# Patient Record
Sex: FEMALE | Race: WHITE | NOT HISPANIC OR LATINO | Employment: OTHER | URBAN - METROPOLITAN AREA
[De-identification: names, ages, dates, MRNs, and addresses within clinical notes are randomized per-mention and may not be internally consistent; named-entity substitution may affect disease eponyms.]

---

## 2017-02-12 ENCOUNTER — APPOINTMENT (OUTPATIENT)
Dept: GENERAL RADIOLOGY | Facility: CLINIC | Age: 62
End: 2017-02-12
Attending: EMERGENCY MEDICINE
Payer: MEDICARE

## 2017-02-12 ENCOUNTER — HOSPITAL ENCOUNTER (EMERGENCY)
Facility: CLINIC | Age: 62
Discharge: HOME | End: 2017-02-12
Attending: EMERGENCY MEDICINE | Admitting: EMERGENCY MEDICINE
Payer: MEDICARE

## 2017-02-12 VITALS
RESPIRATION RATE: 16 BRPM | SYSTOLIC BLOOD PRESSURE: 130 MMHG | WEIGHT: 160 LBS | BODY MASS INDEX: 30.21 KG/M2 | OXYGEN SATURATION: 99 % | DIASTOLIC BLOOD PRESSURE: 89 MMHG | TEMPERATURE: 98.5 F | HEIGHT: 61 IN | HEART RATE: 80 BPM

## 2017-02-12 DIAGNOSIS — M25.551 HIP PAIN, RIGHT: ICD-10-CM

## 2017-02-12 DIAGNOSIS — M70.61 TROCHANTERIC BURSITIS OF BOTH HIPS: ICD-10-CM

## 2017-02-12 DIAGNOSIS — M70.62 TROCHANTERIC BURSITIS OF BOTH HIPS: ICD-10-CM

## 2017-02-12 PROCEDURE — 72170 X-RAY EXAM OF PELVIS: CPT

## 2017-02-12 PROCEDURE — 99283 EMERGENCY DEPT VISIT LOW MDM: CPT

## 2017-02-12 PROCEDURE — 25000132 ZZH RX MED GY IP 250 OP 250 PS 637: Performed by: EMERGENCY MEDICINE

## 2017-02-12 RX ORDER — OXYCODONE AND ACETAMINOPHEN 5; 325 MG/1; MG/1
1 TABLET ORAL ONCE
Status: COMPLETED | OUTPATIENT
Start: 2017-02-12 | End: 2017-02-12

## 2017-02-12 RX ORDER — IBUPROFEN 600 MG/1
600 TABLET, FILM COATED ORAL ONCE
Status: DISCONTINUED | OUTPATIENT
Start: 2017-02-12 | End: 2017-02-12

## 2017-02-12 RX ADMIN — OXYCODONE HYDROCHLORIDE AND ACETAMINOPHEN 1 TABLET: 5; 325 TABLET ORAL at 13:38

## 2017-02-12 ASSESSMENT — ENCOUNTER SYMPTOMS
CHILLS: 1
WEAKNESS: 1

## 2017-02-12 NOTE — ED PROVIDER NOTES
"  History     Chief Complaint:  Leg Pain and Extremity Weakness      HPI   Malgorzata Coe is a 61 year old female with a past medical history of bipolar 1 disorder, back surgery who presents for evaluation of leg pain. The patient reports that she has been having progressively worsening bilateral leg pain with associated lower extremity weakness, right worse than left, over the past two weeks. She was seen by her primary provider a week ago and states that nothing was done to diagnose her pain. She states that she has been having a hard time walking and is experiencing chills, but denies fever. The patient notes that this is not normal occurrence of her back pain and is concerned about DVT or likely diabetes related illness.       Allergies:  Macrodantin  Imitrex      Medications:    Percocet  Maxalt  Melatonin  Abilify  Vesicare  Rantidine  Amlodipine  Folic acid  Lamotrigine  Terazosin  Losartan  Hydroxyzine  Esomeprazole  Zyrtec  Venlafaxine    Past Medical History:    Bipolar 1 disorder  GERD  Migraine    Past Surgical History:    Back surgery     Family History:    History reviewed.  No significant family history.     Social History:  Marital Status:     Smoking status: Current every day smoker  Alcohol use: No      Review of Systems   Constitutional: Positive for chills.   Musculoskeletal:        Positive for bilateral leg pain   Neurological: Positive for weakness.   All other systems reviewed and are negative.    Physical Exam   First Vitals:  BP: 130/89  Pulse: 80  Temp: 98.5  F (36.9  C)  Resp: 16  Height: 154.9 cm (5' 1\")  Weight: 72.6 kg (160 lb)  SpO2: 99 %    Physical Exam  GENERAL: well developed, pleasant  HEAD: atraumatic  EYES: pupils reactive, extraocular muscles intact, conjunctivae normal  ENT:  mucus membranes moist  NECK:  trachea midline, normal range of motion  RESPIRATORY: no tachypnea, breath sounds clear to auscultation   CVS: normal S1/S2, no murmurs, intact distal " pulses  ABDOMEN: soft, nontender, nondistention  MUSCULOSKELETAL: no deformities. Pain to both greater tubercyle. No erythema or warmth. Able to pass multiple range of motions.   SKIN: warm and dry, no acute rashes or ulceration  NEURO: GCS 15, cranial nerves intact, alert and oriented x3  PSYCH:  Mood/affect normal    Emergency Department Course     Imaging:  Radiographic findings were communicated with the patient who voiced understanding of the findings.    Pelvis XR, 1-2 views  Final Result  IMPRESSION:  Bilateral hips are normal. Partially visualized hardware  in the lower lumbar spine.     ALEJA ESPOSITO MD      Interventions:  1338: Percocet, 1 tablet, Oral    ED Course:  Nursing notes and past medical history reviewed.   I performed a physical examination of the patient as documented above.  I explained the plan with the patient who consents to this.   I personally reviewed the imaging results with the Patient and answered all related questions prior to discharge.   Findings and plan explained to the Patient. Patient discharged home with instructions regarding supportive care, medications, and reasons to return. The importance of close follow-up was reviewed.     Impression & Plan      Medical Decision Making:  Malgorzata Coe is a 61 year old female who presents with bilateral hip pain on the lateral aspect. I considered differential including trochanteric bursitis, sepsis joint, osteoarthritis, back etiology, iliotibial band, amongst others. The patient has pain with movement of the hips, but I do not feel there is a septic joint or occult fracture. XR shows no evidence of significant arthritis. Certainly low back etiology is considered as well. I see that she has had numerous prescriptions for narcotics, at least 13 prescribers within the last year and I do not feel that comfortable with ongoing narcotics given this frequency. I suggested antiinflammatories and she noted that she cannot take prednisone or  motrin. I suggested tylenol and following up with her orthopedics for possible injection.     Diagnosis:    ICD-10-CM   1. Hip pain, right M25.551   2. Trochanteric bursitis of both hips M70.61    M70.62         Disposition:   Discharge to home with orthopedics follow up.       SAIMA, Charmaine Lovett, am serving as a scribe on 2/12/2017 at 11:04 AM to personally document services performed by Saud Hernandez MD, based on my observations and the provider's statements to me.         Saud Hernandez MD  02/12/17 204

## 2017-02-12 NOTE — ED NOTES
Pt declined Ibuprofen stating it's upsetting to her stomach. States Tylenol doesn't help so she will wait until after the xray to see if she wants anything for pain.

## 2017-02-12 NOTE — DISCHARGE INSTRUCTIONS
Bursitis  You have bursitis. This is an inflammation of the bursa. These are small, fluid-filled sacs that surround the larger joints of the body. The bursa help the muscles and tendons move smoothly over the joints.  Bursitis often happens in the shoulder. But it can also affect the elbows, hips, pelvis, knees, toes, and heels. Bursitis can be caused by injury, overuse of the joint, or infection of the bursa. Symptoms include pain and tenderness over a joint. Symptoms get worse with movement.  Bursitis is treated with an anti-inflammatory medicine and by resting the joint. More severe cases require injection of medicine directly into the bursa.    Home care    Rest the painful joint and protect it from movement. This will allow the inflammation to heal faster.    Apply an ice pack over the injured area for no more than 15 to 20 minutes. Do this every 3 to 6 hours for the first 24 to 48 hours. Keep using ice packs 3 to 4 times a day until the pain and swelling improves.     To make an ice pack, put ice cubes in a sealed plastic zip-lock bag. Wrap the bag in a clean, thin towel or cloth. Never put ice or an ice pack directly on the skin. As the ice melts, be careful to avoid getting any wrap or splint wet.    You may take over-the-counter pain medicine to treat pain and inflammation, unless another medicine was prescribed. Anti-inflammatory pain medicines may be more effective. Talk with your provider beforeusing these medicines if you have chronic liver or kidney disease, or ever had a stomach ulcer or GI (gastrointestinal) bleeding.    As your symptoms improve, slowly begin to move the joint. Do not overuse the joint. This may cause the symptoms to flare up again.  When to seek medical advice  Call your healthcare provider right away if any of these occur:    Redness over the painful area    Increasing pain or swelling at the joint    Fever of 100.4 F (38 C) or above lasting for 24 to 48 hours    5733-3098 [employer]. [address] [place] [address], [place] [address]. All rights reserved. This information is not intended as a substitute for professional medical care. Always follow your healthcare professional's instructions.

## 2017-02-12 NOTE — ED AVS SNAPSHOT
Emergency Department    [address] [place] [address]    Phone:  [phone]    Fax:  [phone]                                       Malgorzata Coe   MRN: [id number]    Department:   Emergency Department   Date of Visit:  2/12/2017           After Visit Summary Signature Page     I have received my discharge instructions, and my questions have been answered. I have discussed any challenges I see with this plan with the nurse or doctor.    ..........................................................................................................................................  Patient/Patient Representative Signature      ..........................................................................................................................................  Patient Representative Print Name and Relationship to Patient    ..................................................               ................................................  Date                                            Time    ..........................................................................................................................................  Reviewed by Signature/Title    ...................................................              ..............................................  Date                                                            Time

## 2017-02-12 NOTE — ED AVS SNAPSHOT
Emergency Department    [address] [place] [address]    Phone:  [phone]    Fax:  [phone]                                       Malgorzata Coe   MRN: [id number]    Department:   Emergency Department   Date of Visit:  2/12/2017           Patient Information     Date Of Birth          1955        Your diagnoses for this visit were:     Hip pain, right     Trochanteric bursitis of both hips        You were seen by Saud Hernandez MD.      Follow-up Information     Follow up with your orthopedic MD.        Discharge Instructions         Bursitis  You have bursitis. This is an inflammation of the bursa. These are small, fluid-filled sacs that surround the larger joints of the body. The bursa help the muscles and tendons move smoothly over the joints.  Bursitis often happens in the shoulder. But it can also affect the elbows, hips, pelvis, knees, toes, and heels. Bursitis can be caused by injury, overuse of the joint, or infection of the bursa. Symptoms include pain and tenderness over a joint. Symptoms get worse with movement.  Bursitis is treated with an anti-inflammatory medicine and by resting the joint. More severe cases require injection of medicine directly into the bursa.    Home care    Rest the painful joint and protect it from movement. This will allow the inflammation to heal faster.    Apply an ice pack over the injured area for no more than 15 to 20 minutes. Do this every 3 to 6 hours for the first 24 to 48 hours. Keep using ice packs 3 to 4 times a day until the pain and swelling improves.     To make an ice pack, put ice cubes in a sealed plastic zip-lock bag. Wrap the bag in a clean, thin towel or cloth. Never put ice or an ice pack directly on the skin. As the ice melts, be careful to avoid getting any wrap or splint wet.    You may take over-the-counter pain medicine to treat pain and inflammation, unless another medicine was prescribed. Anti-inflammatory pain  medicines may be more effective. Talk with your provider beforeusing these medicines if you have chronic liver or kidney disease, or ever had a stomach ulcer or GI (gastrointestinal) bleeding.    As your symptoms improve, slowly begin to move the joint. Do not overuse the joint. This may cause the symptoms to flare up again.  When to seek medical advice  Call your healthcare provider right away if any of these occur:    Redness over the painful area    Increasing pain or swelling at the joint    Fever of 100.4 F (38 C) or above lasting for 24 to 48 hours    4597-8546 [employer]. [address] [place], [place] [address]. All rights reserved. This information is not intended as a substitute for professional medical care. Always follow your healthcare professional's instructions.          24 Hour Appointment Hotline       To make an appointment at any [facility] (formerly [facility]3], call [phone] ([phone]). If you don't have a family doctor or clinic, we will help you find one. [facility] are conveniently located to serve the needs of you and your family.             Review of your medicines      Our records show that you are taking the medicines listed below. If these are incorrect, please call your family doctor or clinic.        Dose / Directions Last dose taken    AMLODIPINE BESYLATE PO   Dose:  5 mg        Take 5 mg by mouth   Refills:  0        ARIPiprazole 30 MG tablet   Commonly known as:  ABILIFY   Dose:  30 mg        Take 30 mg by mouth daily   Refills:  0        cetirizine 10 MG tablet   Commonly known as:  zyrTEC   Dose:  10 mg        Take 10 mg by mouth daily   Refills:  0        ESOMEPRAZOLE MAGNESIUM PO   Dose:  20 mg        Take 20 mg by mouth   Refills:  0        FOLIC ACID PO   Dose:  1 mg        Take 1 mg by mouth daily   Refills:  0        HYDROXYZINE PAMOATE PO   Dose:  25 mg        Take 25 mg by mouth   Refills:  0        LAMOTRIGINE PO   Dose:  100 mg        Take 100 mg by  mouth   Refills:  0        LOSARTAN POTASSIUM PO   Dose:  100 mg        Take 100 mg by mouth   Refills:  0        MAXALT PO        Refills:  0        MELATONIN PO        Take by mouth nightly as needed   Refills:  0        ONDANSETRON PO   Dose:  4 mg        Take 4 mg by mouth   Refills:  0        PERCOCET PO        Refills:  0        RANITIDINE HCL PO   Dose:  150 mg        Take 150 mg by mouth   Refills:  0        TERAZOSIN HCL PO   Dose:  5 mg        Take 5 mg by mouth   Refills:  0        VENLAFAXINE HCL PO   Dose:  75 mg        Take 75 mg by mouth   Refills:  0        VESICARE PO   Dose:  10 mg        Take 10 mg by mouth   Refills:  0                Procedures and tests performed during your visit     Pelvis XR, 1-2 views      Orders Needing Specimen Collection     None      Pending Results     No orders found from 2/10/2017 to 2/13/2017.            Pending Culture Results     No orders found from 2/10/2017 to 2/13/2017.             Test Results from your hospital stay     2/12/2017 12:40 PM - Interface, Radiant Ib      Narrative     XR PELVIS 1/2 VW  2/12/2017 12:28 PM    HISTORY:  bilateral hip pain, eval osteoarthritis, eval both hips    COMPARISON:  None.        Impression     IMPRESSION:  Bilateral hips are normal. Partially visualized hardware  in the lower lumbar spine.     ALEJA ESPOSITO MD                Clinical Quality Measure: Blood Pressure Screening     Your blood pressure was checked while you were in the emergency department today. The last reading we obtained was  BP: 130/89 . Please read the guidelines below about what these numbers mean and what you should do about them.  If your systolic blood pressure (the top number) is less than 120 and your diastolic blood pressure (the bottom number) is less than 80, then your blood pressure is normal. There is nothing more that you need to do about it.  If your systolic blood pressure (the top number) is 120-139 or your diastolic blood pressure (the  "bottom number) is 80-89, your blood pressure may be higher than it should be. You should have your blood pressure rechecked within a year by a primary care provider.  If your systolic blood pressure (the top number) is 140 or greater or your diastolic blood pressure (the bottom number) is 90 or greater, you may have high blood pressure. High blood pressure is treatable, but if left untreated over time it can put you at risk for heart attack, stroke, or kidney failure. You should have your blood pressure rechecked by a primary care provider within the next 4 weeks.  If your provider in the emergency department today gave you specific instructions to follow-up with your doctor or provider even sooner than that, you should follow that instruction and not wait for up to 4 weeks for your follow-up visit.        Thank you for choosing [facility]       Thank you for choosing [facility] for your care. Our goal is always to provide you with excellent care. Hearing back from our patients is one way we can continue to improve our services. Please take a few minutes to complete the written survey that you may receive in the mail after you visit with us. Thank you!        [employer] Information     [employer] lets you send messages to your doctor, view your test results, renew your prescriptions, schedule appointments and more. To sign up, go to [email or web address] . Click on \"Log in\" on the left side of the screen, which will take you to the Welcome page. Then click on \"Sign up Now\" on the right side of the page.     You will be asked to enter the access code listed below, as well as some personal information. Please follow the directions to create your username and password.     Your access code is: [email or web address]  Expires: 2017  1:39 PM     Your access code will  in 90 days. If you need help or a new code, please call your [facility] clinic or [phone].        Care EveryWhere ID     This is your Care EveryWhere ID. This " could be used by other organizations to access your [facility] medical records  [id number]        After Visit Summary       This is your record. Keep this with you and show to your community pharmacist(s) and doctor(s) at your next visit.

## 2018-04-30 ENCOUNTER — APPOINTMENT (OUTPATIENT)
Dept: ULTRASOUND IMAGING | Facility: CLINIC | Age: 63
End: 2018-04-30
Attending: NURSE PRACTITIONER
Payer: MEDICARE

## 2018-04-30 ENCOUNTER — HOSPITAL ENCOUNTER (EMERGENCY)
Facility: CLINIC | Age: 63
Discharge: HOME | End: 2018-04-30
Attending: NURSE PRACTITIONER | Admitting: NURSE PRACTITIONER
Payer: MEDICARE

## 2018-04-30 VITALS
TEMPERATURE: 97.9 F | RESPIRATION RATE: 16 BRPM | OXYGEN SATURATION: 96 % | DIASTOLIC BLOOD PRESSURE: 75 MMHG | SYSTOLIC BLOOD PRESSURE: 108 MMHG | HEART RATE: 79 BPM

## 2018-04-30 DIAGNOSIS — M79.662 PAIN OF LEFT LOWER LEG: ICD-10-CM

## 2018-04-30 DIAGNOSIS — S70.12XA CONTUSION OF LEFT THIGH, INITIAL ENCOUNTER: ICD-10-CM

## 2018-04-30 DIAGNOSIS — D64.9 ANEMIA, UNSPECIFIED TYPE: ICD-10-CM

## 2018-04-30 LAB
ANION GAP SERPL CALCULATED.3IONS-SCNC: 8 MMOL/L (ref 3–14)
BASOPHILS # BLD AUTO: 0 10E9/L (ref 0–0.2)
BASOPHILS NFR BLD AUTO: 0.5 %
BUN SERPL-MCNC: 16 MG/DL (ref 7–30)
CALCIUM SERPL-MCNC: 8.4 MG/DL (ref 8.5–10.1)
CHLORIDE SERPL-SCNC: 103 MMOL/L (ref 94–109)
CK SERPL-CCNC: 54 U/L (ref 30–225)
CO2 SERPL-SCNC: 28 MMOL/L (ref 20–32)
CREAT SERPL-MCNC: 0.88 MG/DL (ref 0.52–1.04)
DIFFERENTIAL METHOD BLD: ABNORMAL
EOSINOPHIL # BLD AUTO: 0.1 10E9/L (ref 0–0.7)
EOSINOPHIL NFR BLD AUTO: 1.2 %
ERYTHROCYTE [DISTWIDTH] IN BLOOD BY AUTOMATED COUNT: 14.4 % (ref 10–15)
GFR SERPL CREATININE-BSD FRML MDRD: 66 ML/MIN/1.7M2
GLUCOSE SERPL-MCNC: 90 MG/DL (ref 70–99)
HCT VFR BLD AUTO: 32.7 % (ref 35–47)
HGB BLD-MCNC: 11 G/DL (ref 11.7–15.7)
IMM GRANULOCYTES # BLD: 0 10E9/L (ref 0–0.4)
IMM GRANULOCYTES NFR BLD: 0.4 %
LYMPHOCYTES # BLD AUTO: 2.3 10E9/L (ref 0.8–5.3)
LYMPHOCYTES NFR BLD AUTO: 40 %
MCH RBC QN AUTO: 29.4 PG (ref 26.5–33)
MCHC RBC AUTO-ENTMCNC: 33.6 G/DL (ref 31.5–36.5)
MCV RBC AUTO: 87 FL (ref 78–100)
MONOCYTES # BLD AUTO: 0.7 10E9/L (ref 0–1.3)
MONOCYTES NFR BLD AUTO: 12.5 %
NEUTROPHILS # BLD AUTO: 2.6 10E9/L (ref 1.6–8.3)
NEUTROPHILS NFR BLD AUTO: 45.4 %
NRBC # BLD AUTO: 0 10*3/UL
NRBC BLD AUTO-RTO: 0 /100
PLATELET # BLD AUTO: 226 10E9/L (ref 150–450)
POTASSIUM SERPL-SCNC: 4.4 MMOL/L (ref 3.4–5.3)
RBC # BLD AUTO: 3.74 10E12/L (ref 3.8–5.2)
SODIUM SERPL-SCNC: 139 MMOL/L (ref 133–144)
WBC # BLD AUTO: 5.7 10E9/L (ref 4–11)

## 2018-04-30 PROCEDURE — 99284 EMERGENCY DEPT VISIT MOD MDM: CPT | Mod: 25

## 2018-04-30 PROCEDURE — 93971 EXTREMITY STUDY: CPT | Mod: LT

## 2018-04-30 PROCEDURE — 85025 COMPLETE CBC W/AUTO DIFF WBC: CPT | Performed by: NURSE PRACTITIONER

## 2018-04-30 PROCEDURE — 80048 BASIC METABOLIC PNL TOTAL CA: CPT | Performed by: NURSE PRACTITIONER

## 2018-04-30 PROCEDURE — 82550 ASSAY OF CK (CPK): CPT | Performed by: NURSE PRACTITIONER

## 2018-04-30 ASSESSMENT — ENCOUNTER SYMPTOMS
SHORTNESS OF BREATH: 0
WEAKNESS: 1

## 2018-04-30 NOTE — ED AVS SNAPSHOT
Emergency Department    [address] [place] [address]    Phone:  [phone]    Fax:  [phone]                                       Malgorzata Coe   MRN: [id number]    Department:   Emergency Department   Date of Visit:  4/30/2018           After Visit Summary Signature Page     I have received my discharge instructions, and my questions have been answered. I have discussed any challenges I see with this plan with the nurse or doctor.    ..........................................................................................................................................  Patient/Patient Representative Signature      ..........................................................................................................................................  Patient Representative Print Name and Relationship to Patient    ..................................................               ................................................  Date                                            Time    ..........................................................................................................................................  Reviewed by Signature/Title    ...................................................              ..............................................  Date                                                            Time

## 2018-04-30 NOTE — ED AVS SNAPSHOT
Emergency Department    [address] [facility] [address]    Phone:  [phone]    Fax:  [phone]                                       Malgorzata Coe   MRN: [id number]    Department:   Emergency Department   Date of Visit:  4/30/2018           Patient Information     Date Of Birth          1955        Your diagnoses for this visit were:     Pain of left lower leg     Contusion of left thigh, initial encounter     Anemia, unspecified type        You were seen by Luisa Zaman, CNP.      Follow-up Information     Follow up with Ludwig Quintero In 2 days.    Specialty:  Family Practice    Why:  for recheck and to check your hemoglobin    Contact information:    [place] [address]  [facility] [id number] [phone]          Follow up with  Emergency Department.    Specialty:  EMERGENCY MEDICINE    Why:  As needed, If symptoms worsen    Contact information:    [address] [facility] [id number] [phone]        Discharge Instructions         Bruises (Contusions)    A contusion is a bruise. A bruise happens when a blow to your body doesn't break the skin but does break blood vessels beneath the skin. Blood leaking from the broken vessels causes redness and swelling. As it heals, your bruise is likely to turn colors like purple, green, and yellow. This is normal. The bruise should fade in 2 or 3 weeks.  Factors that make you more likely to bruise  Almost everyone bruises now and then. Certain people do bruise more easily than others. You're more prone to bruising as you get older. That's because blood vessels become more fragile with age. You're also more likely to bruise if you have a clotting disorder such as hemophilia or take medicines that reduce clotting, including aspirin and coumadin.  When to go to the emergency room (ER)  Bruises almost always heal on their own without special treatment. But for some people, a bad bruise can be  serious. Seek medical care if you:    Have a clotting disorder such as hemophilia    Have cirrhosis or other serious liver disease    Take blood-thinning medicines such as warfarin  What to expect in the ER  A doctor will examine your bruise and ask about any health conditions you have. In some cases, you may have a test to check how well your blood clots. Other treatment will depend on your needs.  Follow-up care  Sometimes a bruise gets worse instead of better. It may become larger and more swollen. This can occur when your body walls off a small pool of blood under the skin (hematoma). In very rare cases, your doctor may need to drain extra blood from the area.  Tip:  Apply an ice pack or bag of frozen peas to a bruise. Keep a thin cloth between the ice or frozen peas and your skin. The cold can help reduce redness and swelling.   Date Last Reviewed: 12/1/2016 2000-2017 [employer]. [address] [place] [address]. All rights reserved. This information is not intended as a substitute for professional medical care. Always follow your healthcare professional's instructions.          24 Hour Appointment Hotline       To make an appointment at any [facility], call [phone] ([phone]). If you don't have a family doctor or clinic, we will help you find one. [facility] are conveniently located to serve the needs of you and your family.             Review of your medicines      Our records show that you are taking the medicines listed below. If these are incorrect, please call your family doctor or clinic.        Dose / Directions Last dose taken    AMLODIPINE BESYLATE PO   Dose:  5 mg        Take 5 mg by mouth   Refills:  0        ARIPiprazole 30 MG tablet   Commonly known as:  ABILIFY   Dose:  30 mg        Take 30 mg by mouth daily   Refills:  0        cetirizine 10 MG tablet   Commonly known as:  zyrTEC   Dose:  10 mg        Take 10 mg by mouth daily   Refills:  0         ESOMEPRAZOLE MAGNESIUM PO   Dose:  20 mg        Take 20 mg by mouth   Refills:  0        FOLIC ACID PO   Dose:  1 mg        Take 1 mg by mouth daily   Refills:  0        HYDROXYZINE PAMOATE PO   Dose:  25 mg        Take 25 mg by mouth   Refills:  0        LAMOTRIGINE PO   Dose:  100 mg        Take 100 mg by mouth   Refills:  0        LOSARTAN POTASSIUM PO   Dose:  100 mg        Take 100 mg by mouth   Refills:  0        MAXALT PO        Refills:  0        MELATONIN PO        Take by mouth nightly as needed   Refills:  0        ONDANSETRON PO   Dose:  4 mg        Take 4 mg by mouth   Refills:  0        PERCOCET PO        Refills:  0        RANITIDINE HCL PO   Dose:  150 mg        Take 150 mg by mouth   Refills:  0        TERAZOSIN HCL PO   Dose:  5 mg        Take 5 mg by mouth   Refills:  0        VENLAFAXINE HCL PO   Dose:  75 mg        Take 75 mg by mouth   Refills:  0        VESICARE PO   Dose:  10 mg        Take 10 mg by mouth   Refills:  0                Procedures and tests performed during your visit     Basic metabolic panel    CBC with platelets + differential    CK total    US Lower Extremity Venous Duplex Left      Orders Needing Specimen Collection     None      Pending Results     No orders found from 4/28/2018 to 5/1/2018.            Pending Culture Results     No orders found from 4/28/2018 to 5/1/2018.            Pending Results Instructions     If you had any lab results that were not finalized at the time of your Discharge, you can call the ED Lab Result RN at [phone]. You will be contacted by this team for any positive Lab results or changes in treatment. The nurses are available 7 days a week from 10A to 6:30P.  You can leave a message 24 hours per day and they will return your call.        Test Results From Your Hospital Stay        4/30/2018  9:34 PM      Narrative     LEFT LOWER EXTREMITY VENOUS DOPPLER ULTRASOUND  4/30/2018 9:32 PM    HISTORY: Left lower extremity pain. The concern is  for deep venous  thrombosis.    COMPARISON: None.    FINDINGS: Color flow and Doppler spectral waveform analysis  demonstrates normal blood flow in the common femoral, femoral,  popliteal, posterior tibial, and greater saphenous veins of the left  lower extremity. No thrombus is seen.        Impression     IMPRESSION: There is no evidence of deep venous thrombosis in the left  lower extremity.    NATHALIE MCKINLEY MD         4/30/2018  9:20 PM      Component Results     Component Value Ref Range & Units Status    WBC 5.7 4.0 - 11.0 10e9/L Final    RBC Count 3.74 (L) 3.8 - 5.2 10e12/L Final    Hemoglobin 11.0 (L) 11.7 - 15.7 g/dL Final    Hematocrit 32.7 (L) 35.0 - 47.0 % Final    MCV 87 78 - 100 fl Final    MCH 29.4 26.5 - 33.0 pg Final    MCHC 33.6 31.5 - 36.5 g/dL Final    RDW 14.4 10.0 - 15.0 % Final    Platelet Count 226 150 - 450 10e9/L Final    Diff Method Automated Method  Final    % Neutrophils 45.4 % Final    % Lymphocytes 40.0 % Final    % Monocytes 12.5 % Final    % Eosinophils 1.2 % Final    % Basophils 0.5 % Final    % Immature Granulocytes 0.4 % Final    Nucleated RBCs 0 0 /100 Final    Absolute Neutrophil 2.6 1.6 - 8.3 10e9/L Final    Absolute Lymphocytes 2.3 0.8 - 5.3 10e9/L Final    Absolute Monocytes 0.7 0.0 - 1.3 10e9/L Final    Absolute Eosinophils 0.1 0.0 - 0.7 10e9/L Final    Absolute Basophils 0.0 0.0 - 0.2 10e9/L Final    Abs Immature Granulocytes 0.0 0 - 0.4 10e9/L Final    Absolute Nucleated RBC 0.0  Final         4/30/2018  9:33 PM      Component Results     Component Value Ref Range & Units Status    Sodium 139 133 - 144 mmol/L Final    Potassium 4.4 3.4 - 5.3 mmol/L Final    Chloride 103 94 - 109 mmol/L Final    Carbon Dioxide 28 20 - 32 mmol/L Final    Anion Gap 8 3 - 14 mmol/L Final    Glucose 90 70 - 99 mg/dL Final    Urea Nitrogen 16 7 - 30 mg/dL Final    Creatinine 0.88 0.52 - 1.04 mg/dL Final    GFR Estimate 66 >60 mL/min/1.7m2 Final    Non  GFR Calc    GFR  Estimate If Black 79 >60 mL/min/1.7m2 Final    African American GFR Calc    Calcium 8.4 (L) 8.5 - 10.1 mg/dL Final         4/30/2018  9:36 PM      Component Results     Component Value Ref Range & Units Status    CK Total 54 30 - 225 U/L Final                Clinical Quality Measure: Blood Pressure Screening     Your blood pressure was checked while you were in the emergency department today. The last reading we obtained was  BP: 108/75 . Please read the guidelines below about what these numbers mean and what you should do about them.  If your systolic blood pressure (the top number) is less than 120 and your diastolic blood pressure (the bottom number) is less than 80, then your blood pressure is normal. There is nothing more that you need to do about it.  If your systolic blood pressure (the top number) is 120-139 or your diastolic blood pressure (the bottom number) is 80-89, your blood pressure may be higher than it should be. You should have your blood pressure rechecked within a year by a primary care provider.  If your systolic blood pressure (the top number) is 140 or greater or your diastolic blood pressure (the bottom number) is 90 or greater, you may have high blood pressure. High blood pressure is treatable, but if left untreated over time it can put you at risk for heart attack, stroke, or kidney failure. You should have your blood pressure rechecked by a primary care provider within the next 4 weeks.  If your provider in the emergency department today gave you specific instructions to follow-up with your doctor or provider even sooner than that, you should follow that instruction and not wait for up to 4 weeks for your follow-up visit.        Thank you for choosing [facility]       Thank you for choosing [facility] for your care. Our goal is always to provide you with excellent care. Hearing back from our patients is one way we can continue to improve our services. Please take a few minutes to complete the  "written survey that you may receive in the mail after you visit with us. Thank you!        [employer] Information     [employer] lets you send messages to your doctor, view your test results, renew your prescriptions, schedule appointments and more. To sign up, go to [email or web address] . Click on \"Log in\" on the left side of the screen, which will take you to the Welcome page. Then click on \"Sign up Now\" on the right side of the page.     You will be asked to enter the access code listed below, as well as some personal information. Please follow the directions to create your username and password.     Your access code is: [email or web address]  Expires: 2018 11:07 PM     Your access code will  in 90 days. If you need help or a new code, please call your [facility] clinic or [phone].        Care EveryWhere ID     This is your Care EveryWhere ID. This could be used by other organizations to access your [facility] medical records  [id number]        Equal Access to Services     MECCA CRISOSTOMO : Hadsharif vang Sogagan, waaxda luqadaha, qaybta kaalmie cast, irwin jimenez . So [facility] [phone].    ATENCIÓN: Si habla español, tiene a melendez disposición servicios gratuitos de asistencia lingüística. Llame al [phone].    We comply with applicable federal civil rights laws and [place] laws. We do not discriminate on the basis of race, color, national origin, age, disability, sex, sexual orientation, or gender identity.            After Visit Summary       This is your record. Keep this with you and show to your community pharmacist(s) and doctor(s) at your next visit.                  "

## 2018-05-01 NOTE — ED PROVIDER NOTES
History     Chief Complaint:  Leg pain    HPI   Malgorzata Coe is a 62 year old female who presents for evaluation of leg pain. The patient reports onset today of left popliteal and posterior calf pain and swelling. Pain is exacerbated with ambulation. No right-sided symptoms. 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 She has taken percocet for pain. The patient denies any chest pain, dyspnea, cough or hemoptysis, fevers, or any other acute symptoms. She is not on any chronic anticoagulation. No history of PE/DVT    PE/DVT RISK FACTORS:  Sex:    F  Hormones:   N  Tobacco:   Y  Cancer:   N  Travel:   N  Surgery:   N  Other immobilization: N  Personal history:  N  Family history:  N     Allergies:  Contrast Dye  Macrodantin [Nitrofurantoin]  Imitrex [Sumatriptan]     Medications:    Amlodipine  aripirazole  Cetirizine  Esomeprazole  Folic acid  Hydroxyzine  Lamotrigine  Losartan  Percocet  Venlafaxine  Terazosin  Maxalt  Ranitidine      Past Medical History:    Anxiety  Bipolar I  GERD  Migraine   Neuropathy from previous back surgery, per patient    Past Surgical History:    Back surgery    Family History:    History reviewed. No pertinent family history.      Social History:  Current daily tobacco smoker.  Marital Status:   [2]  Non-drinker.     Review of Systems   Respiratory: Negative for shortness of breath.    Cardiovascular: Positive for leg swelling. Negative for chest pain.   Musculoskeletal: Negative for gait problem.        Left leg pain   Neurological: Positive for weakness (chronic).   All other systems reviewed and are negative.    Physical Exam     Patient Vitals for the past 24 hrs:   BP Temp Temp src Pulse Resp SpO2   04/30/18 2045 108/75 97.9  F (36.6  C) Temporal 79 16 96 %      Physical  Exam  Nursing notes reviewed. Vitals reviewed.  General: Alert. Well kept.  Eyes:  Conjunctiva non-injected, non-icteric.  Neck/Throat: Moist mucous membranes. Normal voice.  Cardiac: Regular rhythm. Normal heart sounds.  Pulmonary: Clear and equal breath sounds bilaterally.   Musculoskeletal: LLE: healing mottled dark blue/brown bruising left posterior leg. 1+ ankle edema bilateral. Normal gross range of motion of all 4 extremities. No pain with ROM of left hip, knee, ankle, foot.   Neurological: Alert and oriented x4.   Skin: Warm and dry. Normal appearance of visualized exposed skin without rashes or petechiae.  Psych: Affect normal. Good eye contact.    Emergency Department Course   Imaging:  Radiographic findings were communicated with the patient who voiced understanding of the findings.  US Venous Duplex left lower extremity :  There is no evidence of deep vein thrombosis in the left lower extremity. Results per Radiology.      Laboratory:  Laboratory findings were communicated with the patient who voiced understanding of the findings.  CBC w/diff: RBC 3.74 (L), Hgb 11.0 (L), Hct 32.7 (L),  o/w WNL (WBC 5.7, Plt 226)  BMP: Ca 8.4 (L), o/w WNL (Cr 0.88)  CK: 54 (WNL)     Emergency Department Course:  Past medical records, nursing notes, and vitals reviewed.   2055: I performed an exam of the patient as documented above.    Peripheral IV access established. Blood drawn and sent. The above imaging study and labs  were obtained. Results above.  2227: I shared service with Dr. Nuñez Trigger  2305: I rechecked patient.    I discussed the treatment plan with the patient. She expressed understanding of this plan and consented to discharge. She will be discharged home with instructions for care and follow up. In addition, the patient will return to the emergency department if symptoms persist, worsen, if new symptoms arise or if there is any concern.  All questions were answered.    Impression & Plan    Medical  Decision Making:  Malgorzata Coe is a 62 year old female who presents today with left leg tenderness and pain concerning for a possible DVT.  Ultrasound showed no evidence of DVT.  The patient is otherwise low risk for blood clots. Vital signs are stable and I see no evidence of cellulitis, myositis, bony or musculoskeletal injury or arterial or vascular insufficiency.  Blood work was unremarkable aside from mild anemia (hemoglobin 11.0), which patient states is chronic. She does have healing bruising on her left posterior leg which is likely the cause of her pain.  CK is within normal limits and she has no signs of rhabdomyolysis. he has no pain with ROM of left lower extremity and no indication for xray imaging.  She is ambulatory without pain different from her chronic pain. I feel the patient is stable for discharge.  The patient was instructed to follow-up with PMD in 1 week and to return for symptoms of shortness of breath, chest pain or any other new or worsening symptoms.     Diagnosis:    ICD-10-CM    1. Pain of left lower leg M79.662    2. Contusion of left thigh, initial encounter S70.12XA    3. Anemia, unspecified type D64.9        Disposition:   discharged to home    Scribe Disclosure:  I, Mike Thomas, am serving as a scribe at 8:53 PM on 4/30/2018 to document services personally performed by Luisa Zaman CNP based on my observations and the provider's statements to me.    Mike Thomas  4/30/2018   EMERGENCY DEPARTMENT       Luisa Zaman CNP  05/01/18 1524

## 2018-05-01 NOTE — DISCHARGE INSTRUCTIONS
Bruises (Contusions)    A contusion is a bruise. A bruise happens when a blow to your body doesn't break the skin but does break blood vessels beneath the skin. Blood leaking from the broken vessels causes redness and swelling. As it heals, your bruise is likely to turn colors like purple, green, and yellow. This is normal. The bruise should fade in 2 or 3 weeks.  Factors that make you more likely to bruise  Almost everyone bruises now and then. Certain people do bruise more easily than others. You're more prone to bruising as you get older. That's because blood vessels become more fragile with age. You're also more likely to bruise if you have a clotting disorder such as hemophilia or take medicines that reduce clotting, including aspirin and coumadin.  When to go to the emergency room (ER)  Bruises almost always heal on their own without special treatment. But for some people, a bad bruise can be serious. Seek medical care if you:    Have a clotting disorder such as hemophilia    Have cirrhosis or other serious liver disease    Take blood-thinning medicines such as warfarin  What to expect in the ER  A doctor will examine your bruise and ask about any health conditions you have. In some cases, you may have a test to check how well your blood clots. Other treatment will depend on your needs.  Follow-up care  Sometimes a bruise gets worse instead of better. It may become larger and more swollen. This can occur when your body walls off a small pool of blood under the skin (hematoma). In very rare cases, your doctor may need to drain extra blood from the area.  Tip:  Apply an ice pack or bag of frozen peas to a bruise. Keep a thin cloth between the ice or frozen peas and your skin. The cold can help reduce redness and swelling.   Date Last Reviewed: 12/1/2016 2000-2017 [employer]. [address] [facility], [place] [address]. All rights reserved. This information is not intended as a substitute for  professional medical care. Always follow your healthcare professional's instructions.

## 2018-08-25 ENCOUNTER — HOSPITAL ENCOUNTER (EMERGENCY)
Facility: CLINIC | Age: 63
Discharge: HOME | End: 2018-08-25
Attending: EMERGENCY MEDICINE | Admitting: EMERGENCY MEDICINE
Payer: MEDICARE

## 2018-08-25 VITALS
DIASTOLIC BLOOD PRESSURE: 78 MMHG | HEART RATE: 71 BPM | HEIGHT: 60 IN | TEMPERATURE: 98.1 F | OXYGEN SATURATION: 98 % | SYSTOLIC BLOOD PRESSURE: 118 MMHG | RESPIRATION RATE: 18 BRPM | BODY MASS INDEX: 30.43 KG/M2 | WEIGHT: 155 LBS

## 2018-08-25 DIAGNOSIS — K59.00 CONSTIPATION, UNSPECIFIED CONSTIPATION TYPE: ICD-10-CM

## 2018-08-25 DIAGNOSIS — F41.9 ANXIETY: ICD-10-CM

## 2018-08-25 PROCEDURE — A9270 NON-COVERED ITEM OR SERVICE: HCPCS | Mod: GY | Performed by: EMERGENCY MEDICINE

## 2018-08-25 PROCEDURE — 25000132 ZZH RX MED GY IP 250 OP 250 PS 637: Mod: GY | Performed by: EMERGENCY MEDICINE

## 2018-08-25 PROCEDURE — 99282 EMERGENCY DEPT VISIT SF MDM: CPT

## 2018-08-25 RX ORDER — HYDROXYZINE HYDROCHLORIDE 25 MG/1
100 TABLET, FILM COATED ORAL ONCE
Status: COMPLETED | OUTPATIENT
Start: 2018-08-25 | End: 2018-08-25

## 2018-08-25 RX ORDER — MAGNESIUM CARB/ALUMINUM HYDROX 105-160MG
296 TABLET,CHEWABLE ORAL ONCE
Qty: 1 BOTTLE | Refills: 0 | Status: SHIPPED | OUTPATIENT
Start: 2018-08-25 | End: 2018-08-25

## 2018-08-25 RX ORDER — HYDROXYZINE HYDROCHLORIDE 25 MG/1
50-100 TABLET, FILM COATED ORAL EVERY 6 HOURS PRN
Qty: 15 TABLET | Refills: 0 | Status: SHIPPED | OUTPATIENT
Start: 2018-08-25

## 2018-08-25 RX ADMIN — HYDROXYZINE HYDROCHLORIDE 100 MG: 25 TABLET ORAL at 03:27

## 2018-08-25 ASSESSMENT — ENCOUNTER SYMPTOMS
ANAL BLEEDING: 1
BLOOD IN STOOL: 0
ABDOMINAL PAIN: 1
FEVER: 0
NAUSEA: 0
DIARRHEA: 0
CONSTIPATION: 1
VOMITING: 0
NERVOUS/ANXIOUS: 1

## 2018-08-25 NOTE — ED AVS SNAPSHOT
Emergency Department    [address] [facility] [address]    Phone:  [phone]    Fax:  [phone]                                       Malgorzata Coe   MRN: [id number]    Department:   Emergency Department   Date of Visit:  8/25/2018           Patient Information     Date Of Birth          1955        Your diagnoses for this visit were:     Constipation, unspecified constipation type     Anxiety        You were seen by Mario Carrillo MD.      Follow-up Information     Follow up with Ludwig Quintero Schedule an appointment as soon as possible for a visit in 3 days.    Specialty:  Family Practice    Why:  For close follow up    Contact information:    [place] [address]  [facility] [address] [phone]          Discharge Instructions         Constipation (Adult)  Constipation means that you have bowel movements that are less frequent than usual. Stools often become very hard and difficult to pass.  Constipation is very common. At some point in life it affects almost everyone. Since everyone's bowel habits are different, what is constipation to one person may not be to another. Your healthcare provider may do tests to diagnose constipation. It depends on what he or she finds when evaluating you.    Symptoms of constipation include:    Abdominal pain    Bloating    Vomiting    Painful bowel movements    Itching, swelling, bleeding, or pain around the anus  Causes  Constipation can have many causes. These include:    Diet low in fiber    Too much dairy    Not drinking enough liquids    Lack of exercise or physical activity. This is especially true for older adults.    Changes in lifestyle or daily routine, including pregnancy, aging, work, and travel    Frequent use or misuse of laxatives    Ignoring the urge to have a bowel movement or delaying it until later    Medicines, such as certain prescription pain medicines, iron supplements, antacids, certain  antidepressants, and calcium supplements    Diseases like irritable bowel syndrome, bowel obstructions, stroke, diabetes, thyroid disease, Parkinson disease, hemorrhoids, and colon cancer  Complications  Potential complications of constipation can include:    Hemorrhoids    Rectal bleeding from hemorrhoids or anal fissures (skin tears)    Hernias    Dependency on laxatives    Chronic constipation    Fecal impaction    Bowel obstruction or perforation  Home care  All treatment should be done after talking with your healthcare provider. This is especially true if you have another medical problems, are taking prescription medicines, or are an older adult. Treatment most often involves lifestyle changes. You may also need medicines. Your healthcare provider will tell you which will work best for you. Follow the advice below to help avoid this problem in the future.  Lifestyle changes  These lifestyle changes can help prevent constipation:    Diet. Eat a high-fiber diet, with fresh fruit and vegetables, and reduce dairy intake, meats, and processed foods    Fluids. It's important to get enough fluids each day. Drink plenty of water when you eat more fiber. If you are on diet that limits the amount of fluid you can have, talk about this with your healthcare provider.    Regular exercise. Check with your healthcare provider first.  Medicines  Take any medicines as directed. Some laxatives are safe to use only every now and then. Others can be taken on a regular basis. Talk with your doctor or pharmacist if you have questions.  Prescription pain medicines can cause constipation. If you are taking this kind of medicine, ask your healthcare provider if you should also take a stool softener.  Medicines you may take to treat constipation include:    Fiber supplements    Stool softeners    Laxatives    Enemas    Rectal suppositories  Follow-up care  Follow up with your healthcare provider if symptoms don't get better in the next  few days. You may need to have more tests or see a specialist.  Call 911  Call 911 if any of these occur:    Trouble breathing    Stiff, rigid abdomen that is severely painful to touch    Confusion    Fainting or loss of consciousness    Rapid heart rate    Chest pain  When to seek medical advice  Call your healthcare provider right away if any of these occur:    Fever of 100.4 F (38 C) or higher, or as directed by your healthcare provider    Failure to resume normal bowel movements    Pain in your abdomen or back gets worse    Nausea or vomiting    Swelling in your abdomen    Blood in the stool    Black, tarry stool    Involuntary weight loss    Weakness  Date Last Reviewed: 12/30/2015 2000-2017 [employer]. [address] [place] [address], [place] [address]. All rights reserved. This information is not intended as a substitute for professional medical care. Always follow your healthcare professional's instructions.      Understanding Anxiety Disorders  Almost everyone gets nervous now and then. It s normal to have knots in your stomach before a test, or for your heart to race on a first date. But an anxiety disorder is much more than a case of nerves. In fact, its symptoms may be overwhelming. But treatment can relieve many of these symptoms. Talking to your healthcare provider is the first step.    What are anxiety disorders?  An anxiety disorder causes intense feelings of panic and fear. These feelings may arise for no apparent reason. And they tend to recur again and again. They may prevent you from coping with life and cause you great distress. As a result, you may avoid anything that triggers your fear. In extreme cases, you may never leave the house. Anxiety disorders may cause other symptoms, such as:    Obsessive thoughts you can t control    Constant nightmares or painful thoughts of the past    Nausea, sweating, and muscle tension    Trouble sleeping or concentrating  What causes anxiety  disorders?  Anxiety disorders tend to run in families. For some people, childhood abuse or neglect may play a role. For others, stressful life events or trauma may trigger anxiety disorders. Anxiety can trigger low self-esteem and poor coping skills.  Common anxiety disorders    Panic disorder. This causes an intense fear of being in danger.    Phobias. These are extreme fears of certain objects, places, or events.    Obsessive-compulsive disorder. This causes you to have unwanted thoughts and urges. You also may perform certain actions over and over.    Posttraumatic stress disorder. This occurs in people who have survived a terrible ordeal. It can cause nightmares and flashbacks about the event.    Generalized anxiety disorder. This causes constant worry that can greatly disrupt your life.   Getting better  You may believe that nothing can help you. Or, you might fear what others may think. But most anxiety symptoms can be eased. Having an anxiety disorder is nothing to be ashamed of. Most people do best with treatment that combines medicine and therapy. These aren t cures. But they can help you live a healthier life.  Date Last Reviewed: 2/1/2017 2000-2017 [employer]. [address] [place] [address]. All rights reserved. This information is not intended as a substitute for professional medical care. Always follow your healthcare professional's instructions.            24 Hour Appointment Hotline       To make an appointment at any [facility] clinic, call [phone] ([phone]). If you don't have a family doctor or clinic, we will help you find one. [facility] clinics are conveniently located to serve the needs of you and your family.             Review of your medicines      START taking        Dose / Directions Last dose taken    hydrOXYzine 25 MG tablet   Commonly known as:  ATARAX   Dose:   mg   Quantity:  15 tablet        Take 2-4 tablets ( mg) by mouth every 6  hours as needed for anxiety   Refills:  0        magnesium citrate 1.745 GM/30ML solution   Commonly known as:  [employer] MAGNESIUM CITRATE   Dose:  296 mL   Quantity:  1 Bottle        Take 296 mLs by mouth once for 1 dose For treatment of constipation.   Refills:  0          Our records show that you are taking the medicines listed below. If these are incorrect, please call your family doctor or clinic.        Dose / Directions Last dose taken    AMLODIPINE BESYLATE PO   Dose:  5 mg        Take 5 mg by mouth   Refills:  0        ARIPiprazole 30 MG tablet   Commonly known as:  ABILIFY   Dose:  30 mg        Take 30 mg by mouth daily   Refills:  0        cetirizine 10 MG tablet   Commonly known as:  zyrTEC   Dose:  10 mg        Take 10 mg by mouth daily   Refills:  0        ESOMEPRAZOLE MAGNESIUM PO   Dose:  20 mg        Take 20 mg by mouth   Refills:  0        FOLIC ACID PO   Dose:  1 mg        Take 1 mg by mouth daily   Refills:  0        HYDROXYZINE PAMOATE PO   Dose:  25 mg        Take 25 mg by mouth   Refills:  0        LAMOTRIGINE PO   Dose:  100 mg        Take 100 mg by mouth   Refills:  0        LOSARTAN POTASSIUM PO   Dose:  100 mg        Take 100 mg by mouth   Refills:  0        MAXALT PO        Refills:  0        MELATONIN PO        Take by mouth nightly as needed   Refills:  0        ONDANSETRON PO   Dose:  4 mg        Take 4 mg by mouth   Refills:  0        PERCOCET PO        Refills:  0        RANITIDINE HCL PO   Dose:  150 mg        Take 150 mg by mouth   Refills:  0        TERAZOSIN HCL PO   Dose:  5 mg        Take 5 mg by mouth   Refills:  0        VENLAFAXINE HCL PO   Dose:  75 mg        Take 75 mg by mouth   Refills:  0        VESICARE PO   Dose:  10 mg        Take 10 mg by mouth   Refills:  0                Prescriptions were sent or printed at these locations (2 Prescriptions)                   Other Prescriptions                Printed at Department/Unit printer (2 of 2)         hydrOXYzine (ATARAX) 25  MG tablet               magnesium citrate ([employer] MAGNESIUM CITRATE) 1.745 GM/30ML solution                Orders Needing Specimen Collection     None      Pending Results     No orders found from 8/23/2018 to 8/26/2018.            Pending Culture Results     No orders found from 8/23/2018 to 8/26/2018.            Pending Results Instructions     If you had any lab results that were not finalized at the time of your Discharge, you can call the ED Lab Result RN at [phone]. You will be contacted by this team for any positive Lab results or changes in treatment. The nurses are available 7 days a week from 10A to 6:30P.  You can leave a message 24 hours per day and they will return your call.        Test Results From Your Hospital Stay               Clinical Quality Measure: Blood Pressure Screening     Your blood pressure was checked while you were in the emergency department today. The last reading we obtained was  BP: 109/78 . Please read the guidelines below about what these numbers mean and what you should do about them.  If your systolic blood pressure (the top number) is less than 120 and your diastolic blood pressure (the bottom number) is less than 80, then your blood pressure is normal. There is nothing more that you need to do about it.  If your systolic blood pressure (the top number) is 120-139 or your diastolic blood pressure (the bottom number) is 80-89, your blood pressure may be higher than it should be. You should have your blood pressure rechecked within a year by a primary care provider.  If your systolic blood pressure (the top number) is 140 or greater or your diastolic blood pressure (the bottom number) is 90 or greater, you may have high blood pressure. High blood pressure is treatable, but if left untreated over time it can put you at risk for heart attack, stroke, or kidney failure. You should have your blood pressure rechecked by a primary care provider within the next 4 weeks.  If your  "provider in the emergency department today gave you specific instructions to follow-up with your doctor or provider even sooner than that, you should follow that instruction and not wait for up to 4 weeks for your follow-up visit.        Thank you for choosing [facility]       Thank you for choosing [facility] for your care. Our goal is always to provide you with excellent care. Hearing back from our patients is one way we can continue to improve our services. Please take a few minutes to complete the written survey that you may receive in the mail after you visit with us. Thank you!        [employer] Information     [employer] lets you send messages to your doctor, view your test results, renew your prescriptions, schedule appointments and more. To sign up, go to [email or web address] . Click on \"Log in\" on the left side of the screen, which will take you to the Welcome page. Then click on \"Sign up Now\" on the right side of the page.     You will be asked to enter the access code listed below, as well as some personal information. Please follow the directions to create your username and password.     Your access code is: [email or web address]  Expires: 2018  3:16 AM     Your access code will  in 90 days. If you need help or a new code, please call your [facility] clinic or [phone].        Care EveryWhere ID     This is your Care EveryWhere ID. This could be used by other organizations to access your [facility] medical records  [id number]        Equal Access to Services     MECCA CRISOSTOMO AH: Hadii erickson reyo Sogagan, waaxda luqadaha, qaybta kaalmada adeegyada, irwin jimenez . So [facility] [phone].    ATENCIÓN: Si habla español, tiene a melendez disposición servicios gratuitos de asistencia lingüística. Garret al [phone].    We comply with applicable federal civil rights laws and [place] laws. We do not discriminate on the basis of race, color, national origin, age, disability, sex, sexual " orientation, or gender identity.            After Visit Summary       This is your record. Keep this with you and show to your community pharmacist(s) and doctor(s) at your next visit.

## 2018-08-25 NOTE — ED AVS SNAPSHOT
Emergency Department    [address] [place] [address]    Phone:  [phone]    Fax:  [phone]                                       Malgorzata Coe   MRN: [id number]    Department:   Emergency Department   Date of Visit:  8/25/2018           After Visit Summary Signature Page     I have received my discharge instructions, and my questions have been answered. I have discussed any challenges I see with this plan with the nurse or doctor.    ..........................................................................................................................................  Patient/Patient Representative Signature      ..........................................................................................................................................  Patient Representative Print Name and Relationship to Patient    ..................................................               ................................................  Date                                            Time    ..........................................................................................................................................  Reviewed by Signature/Title    ...................................................              ..............................................  Date                                                            Time          22EPIC Rev 08/18

## 2018-08-25 NOTE — DISCHARGE INSTRUCTIONS
Constipation (Adult)  Constipation means that you have bowel movements that are less frequent than usual. Stools often become very hard and difficult to pass.  Constipation is very common. At some point in life it affects almost everyone. Since everyone's bowel habits are different, what is constipation to one person may not be to another. Your healthcare provider may do tests to diagnose constipation. It depends on what he or she finds when evaluating you.    Symptoms of constipation include:    Abdominal pain    Bloating    Vomiting    Painful bowel movements    Itching, swelling, bleeding, or pain around the anus  Causes  Constipation can have many causes. These include:    Diet low in fiber    Too much dairy    Not drinking enough liquids    Lack of exercise or physical activity. This is especially true for older adults.    Changes in lifestyle or daily routine, including pregnancy, aging, work, and travel    Frequent use or misuse of laxatives    Ignoring the urge to have a bowel movement or delaying it until later    Medicines, such as certain prescription pain medicines, iron supplements, antacids, certain antidepressants, and calcium supplements    Diseases like irritable bowel syndrome, bowel obstructions, stroke, diabetes, thyroid disease, Parkinson disease, hemorrhoids, and colon cancer  Complications  Potential complications of constipation can include:    Hemorrhoids    Rectal bleeding from hemorrhoids or anal fissures (skin tears)    Hernias    Dependency on laxatives    Chronic constipation    Fecal impaction    Bowel obstruction or perforation  Home care  All treatment should be done after talking with your healthcare provider. This is especially true if you have another medical problems, are taking prescription medicines, or are an older adult. Treatment most often involves lifestyle changes. You may also need medicines. Your healthcare provider will tell you which will work best for you. Follow  the advice below to help avoid this problem in the future.  Lifestyle changes  These lifestyle changes can help prevent constipation:    Diet. Eat a high-fiber diet, with fresh fruit and vegetables, and reduce dairy intake, meats, and processed foods    Fluids. It's important to get enough fluids each day. Drink plenty of water when you eat more fiber. If you are on diet that limits the amount of fluid you can have, talk about this with your healthcare provider.    Regular exercise. Check with your healthcare provider first.  Medicines  Take any medicines as directed. Some laxatives are safe to use only every now and then. Others can be taken on a regular basis. Talk with your doctor or pharmacist if you have questions.  Prescription pain medicines can cause constipation. If you are taking this kind of medicine, ask your healthcare provider if you should also take a stool softener.  Medicines you may take to treat constipation include:    Fiber supplements    Stool softeners    Laxatives    Enemas    Rectal suppositories  Follow-up care  Follow up with your healthcare provider if symptoms don't get better in the next few days. You may need to have more tests or see a specialist.  Call 911  Call 911 if any of these occur:    Trouble breathing    Stiff, rigid abdomen that is severely painful to touch    Confusion    Fainting or loss of consciousness    Rapid heart rate    Chest pain  When to seek medical advice  Call your healthcare provider right away if any of these occur:    Fever of 100.4 F (38 C) or higher, or as directed by your healthcare provider    Failure to resume normal bowel movements    Pain in your abdomen or back gets worse    Nausea or vomiting    Swelling in your abdomen    Blood in the stool    Black, tarry stool    Involuntary weight loss    Weakness  Date Last Reviewed: 12/30/2015 2000-2017 [employer]. [address] [place] [address], [place] [address]. All rights reserved. This  information is not intended as a substitute for professional medical care. Always follow your healthcare professional's instructions.      Understanding Anxiety Disorders  Almost everyone gets nervous now and then. It s normal to have knots in your stomach before a test, or for your heart to race on a first date. But an anxiety disorder is much more than a case of nerves. In fact, its symptoms may be overwhelming. But treatment can relieve many of these symptoms. Talking to your healthcare provider is the first step.    What are anxiety disorders?  An anxiety disorder causes intense feelings of panic and fear. These feelings may arise for no apparent reason. And they tend to recur again and again. They may prevent you from coping with life and cause you great distress. As a result, you may avoid anything that triggers your fear. In extreme cases, you may never leave the house. Anxiety disorders may cause other symptoms, such as:    Obsessive thoughts you can t control    Constant nightmares or painful thoughts of the past    Nausea, sweating, and muscle tension    Trouble sleeping or concentrating  What causes anxiety disorders?  Anxiety disorders tend to run in families. For some people, childhood abuse or neglect may play a role. For others, stressful life events or trauma may trigger anxiety disorders. Anxiety can trigger low self-esteem and poor coping skills.  Common anxiety disorders    Panic disorder. This causes an intense fear of being in danger.    Phobias. These are extreme fears of certain objects, places, or events.    Obsessive-compulsive disorder. This causes you to have unwanted thoughts and urges. You also may perform certain actions over and over.    Posttraumatic stress disorder. This occurs in people who have survived a terrible ordeal. It can cause nightmares and flashbacks about the event.    Generalized anxiety disorder. This causes constant worry that can greatly disrupt your life.   Getting  better  You may believe that nothing can help you. Or, you might fear what others may think. But most anxiety symptoms can be eased. Having an anxiety disorder is nothing to be ashamed of. Most people do best with treatment that combines medicine and therapy. These aren t cures. But they can help you live a healthier life.  Date Last Reviewed: 2/1/2017 2000-2017 [employer]. [address] [place] [address], [place] [address]. All rights reserved. This information is not intended as a substitute for professional medical care. Always follow your healthcare professional's instructions.

## 2018-08-25 NOTE — ED PROVIDER NOTES
History     Chief Complaint:  Constipation    HPI   Malgorzata Coe is a 62 year old female who presents to the emergency department today for evaluation of constipation. The patient reports she has been experiencing constipation for the past month. Recently, she has been having worsening abdominal cramping and pressure. She does have known hemorrhoids with some bleeding present upon wiping but no large amount of rectal bleeding. She has been taking 1-2 tablets of Percocet for back pain for the last month or longer. She did have a bowel movement yesterday that was small and hard. She has been able to pass flatus. She went into her primary care physician today who took x-rays and performed a CBC as noted below. He told her it was constipation and prescribed her Miralax, Senna and Dulcolax for today and tomorrow. She states that she was having difficulty sleeping due to her symptoms and presents to the ED. At arrival, she also notes that she is anxious about the current constipation and has taken Klonopin in the past with her last dose last weekend for her anxiety and recent family losses. She denies bloody stools, fever, diarrhea, nausea, and vomiting, shortness of breath, urinary symtoms.      XR ABD 2 VIEWS ROUTINE (8/24/2018)  FINDINGS: Moderate modest stool is present in the left hemicolon. Air is seen throughout most of the right hemicolon. Air-fluid levels are present within the right hemicolon and small bowel findings are not suggestive of obstruction. No free air.  Pelvic phleboliths. Previous lumbar spine fusion.  Report per Radiology    CBC at clinic: WNL (WBC 6.7, HGB 12.2, )    Allergies:  Contrast Dye  Macrodantin [Nitrofurantoin]  Imitrex [Sumatriptan]    Medications:    AMLODIPINE BESYLATE PO  ARIPiprazole (ABILIFY) 30 MG tablet  cetirizine (ZYRTEC) 10 MG tablet  ESOMEPRAZOLE MAGNESIUM PO  FOLIC ACID PO  HYDROXYZINE PAMOATE PO  LAMOTRIGINE PO  LOSARTAN POTASSIUM PO  MELATONIN PO  ONDANSETRON  PO  Oxycodone-Acetaminophen (PERCOCET PO)  RANITIDINE HCL PO  Rizatriptan Benzoate (MAXALT PO)  Solifenacin Succinate (VESICARE PO)  TERAZOSIN HCL PO  VENLAFAXINE HCL PO    Past Medical History:    Anxiety  Bipolar 1 disorder  GERD  Migraines    Past Surgical History:    Back surgery    Family History:    History reviewed. No pertinent family history.     Social History:  The patient was alone.  Smoking Status: Current Every Day Smoker  Alcohol Use: No  Marital Status:       Review of Systems   Constitutional: Negative for fever.   Gastrointestinal: Positive for abdominal pain, anal bleeding and constipation. Negative for blood in stool, diarrhea, nausea and vomiting.   Psychiatric/Behavioral: The patient is nervous/anxious.    All other systems reviewed and are negative.    Physical Exam     Patient Vitals for the past 24 hrs:   BP Temp Temp src Pulse Resp SpO2 Height Weight   08/25/18 0343 118/78 - - 71 18 98 % - -   08/25/18 0137 109/78 98.1  F (36.7  C) Oral 75 24 96 % 1.524 m (5') 70.3 kg (155 lb)         Physical Exam  General: Well appearing, nontoxic. Resting comfortably  Head:  Scalp, face, and head appear normal  Eyes:  Pupils are equal, round, and reactive to light    Conjunctivae non-injected and sclerae white  ENT:    The external nose is normal    Pinnae are normal    The oropharynx is normal, mucous membranes moist    Uvula is in the midline  Neck:  Trachea is in the midline  CV:  Regular rate and rhythm     Normal S1/S2, no S3/S4    No murmur or rub  Resp:  Lungs are clear and equal bilaterally    There is no tachypnea    No increased work of breathing    No rales, wheezing, or rhonchi  GI:  Abdomen is soft, no rigidity or guarding    No distension, or mass    No tenderness or rebound tenderness No CVA tenderness   Rectal Exam:  No external anal lesions. Small soft internal hemorrhoids palpable without tenderness. Rectal tone normal. No bright red blood or melena. No internal masses or lesions  palpated. No stool impaction present.     MS:  Normal muscular tone    Symmetric motor strength    No lower extremity edema  Skin:  No rash or acute skin lesions noted  Neuro:  Awake and alert    Speech is normal and fluent    Moves all extremities spontaneously  Psych: Normal affect.  Appropriate interactions.     Emergency Department Course     Emergency Department Course:  Nursing notes and vitals reviewed.  0153: I performed an exam of the patient as documented above.   0209: Patient rechecked and updated. I performed a rectal exam with chaperon present as noted above.  0320: Patient rechecked and updated.   Findings and plan explained to the Patient. Patient discharged home with instructions regarding supportive care, medications, and reasons to return. The importance of close follow-up was reviewed. The patient was prescribed Atarax and magnesium citrate.  I personally answered all related questions with the patient prior to discharge.    Impression & Plan    Medical Decision Making:  Malgorzata Coe is a 62 year old female who presents for evaluation for decreased stool output and abdominal cramping with a benign abdominal exam and no evidece to suggest peritonitis or acute surgical emergency. Signs and symptoms are consistent with constipation. Abdominal radiograph report and CBC done by PCP today reviewed and otherwise unremarkable. No evidence to suggest bowel obstruction or perforation. Rectal exam without stool impaction.  There are no indications for advanced imaging or admission. Patient instructed to continue therapies as prescribed by her PCP. I would add one dose of magnesium citrate to take at home. I recommended continuing Miralax daily to prevent recurrent constipation. Patient endorsed significant anxiety symptoms. Will trial short course of hydroxyzine. I recommended patient follow up with her PCP or seek counseling/therapy to help further with her anxiety. Return precautions were discussed with  patient. The patient's questions were answered and the patient was agreeable with discharge.     Diagnosis:    ICD-10-CM    1. Constipation, unspecified constipation type K59.00    2. Anxiety F41.9        Disposition:  discharged to home    Discharge Medications:  New Prescriptions    HYDROXYZINE (ATARAX) 25 MG TABLET    Take 2-4 tablets ( mg) by mouth every 6 hours as needed for anxiety    MAGNESIUM CITRATE ([employer] MAGNESIUM CITRATE) 1.745 GM/30ML SOLUTION    Take 296 mLs by mouth once for 1 dose For treatment of constipation.         Scribe Disclosure:  Chinmay LANDAVERDE, am serving as a scribe at 1:53 AM on 8/25/2018 to document services personally performed by Mario Carrillo MD based on my observations and the provider's statements to me.     8/25/2018    EMERGENCY DEPARTMENT       Mario Carrillo MD  08/25/18 0367

## 2019-01-18 ENCOUNTER — HOSPITAL ENCOUNTER (EMERGENCY)
Facility: CLINIC | Age: 64
Discharge: HOME | End: 2019-01-18
Attending: EMERGENCY MEDICINE | Admitting: EMERGENCY MEDICINE
Payer: MEDICARE

## 2019-01-18 ENCOUNTER — APPOINTMENT (OUTPATIENT)
Dept: CT IMAGING | Facility: CLINIC | Age: 64
End: 2019-01-18
Payer: MEDICARE

## 2019-01-18 VITALS
HEART RATE: 67 BPM | OXYGEN SATURATION: 94 % | WEIGHT: 148 LBS | RESPIRATION RATE: 21 BRPM | TEMPERATURE: 98 F | SYSTOLIC BLOOD PRESSURE: 110 MMHG | DIASTOLIC BLOOD PRESSURE: 79 MMHG | HEIGHT: 60 IN | BODY MASS INDEX: 29.06 KG/M2

## 2019-01-18 DIAGNOSIS — R10.9 RIGHT FLANK PAIN: ICD-10-CM

## 2019-01-18 DIAGNOSIS — Z98.890 STATUS POST LUMBAR SURGERY: ICD-10-CM

## 2019-01-18 LAB
ALBUMIN SERPL-MCNC: 3 G/DL (ref 3.4–5)
ALBUMIN UR-MCNC: 10 MG/DL
ALP SERPL-CCNC: 91 U/L (ref 40–150)
ALT SERPL W P-5'-P-CCNC: 10 U/L (ref 0–50)
AMORPH CRY #/AREA URNS HPF: ABNORMAL /HPF
ANION GAP SERPL CALCULATED.3IONS-SCNC: 2 MMOL/L (ref 3–14)
APPEARANCE UR: ABNORMAL
AST SERPL W P-5'-P-CCNC: 11 U/L (ref 0–45)
BACTERIA #/AREA URNS HPF: ABNORMAL /HPF
BASOPHILS # BLD AUTO: 0 10E9/L (ref 0–0.2)
BASOPHILS NFR BLD AUTO: 0.4 %
BILIRUB SERPL-MCNC: 0.2 MG/DL (ref 0.2–1.3)
BILIRUB UR QL STRIP: NEGATIVE
BUN SERPL-MCNC: 15 MG/DL (ref 7–30)
CALCIUM SERPL-MCNC: 8.3 MG/DL (ref 8.5–10.1)
CHLORIDE SERPL-SCNC: 105 MMOL/L (ref 94–109)
CO2 SERPL-SCNC: 34 MMOL/L (ref 20–32)
COLOR UR AUTO: YELLOW
CREAT SERPL-MCNC: 0.87 MG/DL (ref 0.52–1.04)
DIFFERENTIAL METHOD BLD: ABNORMAL
EOSINOPHIL # BLD AUTO: 0.2 10E9/L (ref 0–0.7)
EOSINOPHIL NFR BLD AUTO: 4.3 %
ERYTHROCYTE [DISTWIDTH] IN BLOOD BY AUTOMATED COUNT: 16.5 % (ref 10–15)
GFR SERPL CREATININE-BSD FRML MDRD: 72 ML/MIN/{1.73_M2}
GLUCOSE SERPL-MCNC: 107 MG/DL (ref 70–99)
GLUCOSE UR STRIP-MCNC: NEGATIVE MG/DL
HCT VFR BLD AUTO: 32.3 % (ref 35–47)
HGB BLD-MCNC: 10.4 G/DL (ref 11.7–15.7)
HGB UR QL STRIP: NEGATIVE
HYALINE CASTS #/AREA URNS LPF: 1 /LPF (ref 0–2)
IMM GRANULOCYTES # BLD: 0 10E9/L (ref 0–0.4)
IMM GRANULOCYTES NFR BLD: 0.2 %
KETONES UR STRIP-MCNC: NEGATIVE MG/DL
LEUKOCYTE ESTERASE UR QL STRIP: ABNORMAL
LYMPHOCYTES # BLD AUTO: 1.6 10E9/L (ref 0.8–5.3)
LYMPHOCYTES NFR BLD AUTO: 35 %
MCH RBC QN AUTO: 29 PG (ref 26.5–33)
MCHC RBC AUTO-ENTMCNC: 32.2 G/DL (ref 31.5–36.5)
MCV RBC AUTO: 90 FL (ref 78–100)
MONOCYTES # BLD AUTO: 0.7 10E9/L (ref 0–1.3)
MONOCYTES NFR BLD AUTO: 14.7 %
MUCOUS THREADS #/AREA URNS LPF: PRESENT /LPF
NEUTROPHILS # BLD AUTO: 2.1 10E9/L (ref 1.6–8.3)
NEUTROPHILS NFR BLD AUTO: 45.4 %
NITRATE UR QL: NEGATIVE
NRBC # BLD AUTO: 0 10*3/UL
NRBC BLD AUTO-RTO: 0 /100
PH UR STRIP: 7 PH (ref 5–7)
PLATELET # BLD AUTO: 259 10E9/L (ref 150–450)
POTASSIUM SERPL-SCNC: 4 MMOL/L (ref 3.4–5.3)
PROT SERPL-MCNC: 6.4 G/DL (ref 6.8–8.8)
RBC # BLD AUTO: 3.59 10E12/L (ref 3.8–5.2)
RBC #/AREA URNS AUTO: 0 /HPF (ref 0–2)
SODIUM SERPL-SCNC: 141 MMOL/L (ref 133–144)
SOURCE: ABNORMAL
SP GR UR STRIP: 1.02 (ref 1–1.03)
SQUAMOUS #/AREA URNS AUTO: 1 /HPF (ref 0–1)
UROBILINOGEN UR STRIP-MCNC: 2 MG/DL (ref 0–2)
WBC # BLD AUTO: 4.6 10E9/L (ref 4–11)
WBC #/AREA URNS AUTO: 0 /HPF (ref 0–5)

## 2019-01-18 PROCEDURE — 85025 COMPLETE CBC W/AUTO DIFF WBC: CPT | Performed by: EMERGENCY MEDICINE

## 2019-01-18 PROCEDURE — 99285 EMERGENCY DEPT VISIT HI MDM: CPT | Mod: 25

## 2019-01-18 PROCEDURE — 76705 ECHO EXAM OF ABDOMEN: CPT

## 2019-01-18 PROCEDURE — A9270 NON-COVERED ITEM OR SERVICE: HCPCS | Mod: GY | Performed by: EMERGENCY MEDICINE

## 2019-01-18 PROCEDURE — 81001 URINALYSIS AUTO W/SCOPE: CPT | Performed by: EMERGENCY MEDICINE

## 2019-01-18 PROCEDURE — 74176 CT ABD & PELVIS W/O CONTRAST: CPT

## 2019-01-18 PROCEDURE — 80053 COMPREHEN METABOLIC PANEL: CPT | Performed by: EMERGENCY MEDICINE

## 2019-01-18 PROCEDURE — 25000132 ZZH RX MED GY IP 250 OP 250 PS 637: Performed by: EMERGENCY MEDICINE

## 2019-01-18 RX ORDER — OXYCODONE HYDROCHLORIDE 5 MG/1
5 TABLET ORAL ONCE
Status: COMPLETED | OUTPATIENT
Start: 2019-01-18 | End: 2019-01-18

## 2019-01-18 RX ORDER — TIZANIDINE 2 MG/1
2 TABLET ORAL 3 TIMES DAILY PRN
Qty: 10 TABLET | Refills: 0 | Status: SHIPPED | OUTPATIENT
Start: 2019-01-18 | End: 2019-02-17

## 2019-01-18 RX ADMIN — OXYCODONE HYDROCHLORIDE 5 MG: 5 TABLET ORAL at 18:12

## 2019-01-18 ASSESSMENT — ENCOUNTER SYMPTOMS
HEMATURIA: 1
CONSTIPATION: 0
VOMITING: 1
FLANK PAIN: 1
FEVER: 0

## 2019-01-18 ASSESSMENT — MIFFLIN-ST. JEOR: SCORE: 1157.82

## 2019-01-18 NOTE — ED PROVIDER NOTES
History     Chief Complaint:  Flank Pain    HPI   Malgorzata Coe is a 63 year old female who presents with flank pain. The patient reports constant right flank pain for the last 2-3 weeks. At onset it was more severe, associated with nausea and hematuria.  She had back surgery on December 7th and states that she has some constipation after the surgery but she had gotten over that. She is on Cefdinir for a sinus infection that she has been on for 4-5 days. She denies any pain radiating down her legs. She states that when the pain started, she did have some emesis.  Denies fevers, persistent hematuria, diarrhea or constipation.    Allergies:  Contrast dye  Macrodantin  Imitrex     Medications:    Amlodipine besylate po  Aripiprazole (abilify) 30 mg tablet  Cetirizine (zyrtec) 10 mg tablet  Esomeprazole magnesium po  Folic acid po  Hydroxyzine (atarax) 25 mg tablet  Hydroxyzine pamoate po  Lamotrigine po  Losartan potassium po  Melatonin po  Ondansetron po  Oxycodone-acetaminophen (percocet po)  Ranitidine hcl po  Rizatriptan benzoate (maxalt po)  Solifenacin succinate (vesicare po)  Terazosin hcl po  Venlafaxine hcl po    Past Medical History:    Anxiety  Bipolar 1 disorder  Gastroesophageal reflux disease  Migraine  Patient reports kidney stones    Past Surgical History:    Back surgery    Family History:    History reviewed. No pertinent family history.    Social History:  Patient is   Tobacco Use: Current smoker  Alcohol Use: No  PCP: Ludwig Quintero     Review of Systems   Constitutional: Negative for fever.   Gastrointestinal: Positive for vomiting. Negative for constipation.   Genitourinary: Positive for flank pain and hematuria.   All other systems reviewed and are negative.    Physical Exam   First Vitals:  Patient Vitals for the past 24 hrs:   BP Temp Temp src Pulse Heart Rate Resp SpO2 Height Weight   01/18/19 1959 110/79 -- -- 67 67 21 94 % -- --   01/18/19 1729 117/73 98  F (36.7  C) Temporal -- 84  20 97 % 1.524 m (5') 67.1 kg (148 lb)     Physical Exam  Eyes:               Sclera white; Pupils are equal and round  ENT:                External ears and nares normal  CV:                  Rate as above with regular rhythm   Resp:               Breath sounds clear and equal bilaterally                          Non-labored, no retractions or accessory muscle use  GI:                   Abdomen is soft, non-tender, non-distended                          No rebound tenderness or peritoneal features  :                  No CVA tenderness  MS:                  Moves all extremities  Skin:                Warm and dry, lumbar incision c/d/i, no rash on flank  Neuro:             Speech is normal and fluent. No apparent deficit.    Emergency Department Course     Imaging:  Radiographic findings were communicated with the patient who voiced understanding of the findings.  CT abd pelvis w/o contrast:  1. No renal, ureteral, or bladder stones.  2. Compression deformity of L5, and some erosive changes of L4 noted  of uncertain chronicity. Per radiology read.    Laboratory:  CBC:  WBC 4.6, HGB 10.4 low,   CMP: Carbon Dioxide 34 high, Anion Gap 2 low, Glucose 107 high, Calcium 8.3 low, Albumin 3.0 low, Protein Total 6.4 low, o/w WNL. (Creatinine 0.87)  UA: Protein Albumin 10 (A), Leukocyte Esterase small (A), Bacteria few (A), Mucous present (A), Amorphous Crystals few (A), o/w WNL    Procedures:  POC US abdomen limited:  Performed by: Dr. Gannon  Indication: R abdominal pain  Body area(s) imaged: Gallbladder, LLD position as supine had poor windows  Findings: No gallstones or pericholecystic fluid visualized  Impression: Same  Archived to PACS    Interventions:  1812: Oxycodone 5mg PO    Emergency Department Course:  5:33 PM Nursing notes and vitals reviewed.  I performed an exam of the patient as documented above.   7:37 PM I rechecked on and update the patient.  7:54 PM Findings and plan explained to the patient.  Patient discharged home with instructions regarding supportive care, medications, and reasons to return. The importance of close follow-up was reviewed.     Impression & Plan      Medical Decision Making:  Malgorzata Coe is a 63 year old female who presents for evaluation of right sided flank pain. Her reports of nausea and hematuria are concerning for ureteral stone or malignancy. Neither of there were found on today's workup. UA no longer has hematuria and there is no evidence for urinary tract infection. Labs did not show hepatitis or pancreatitis as etiology. Bedside ultrasound was done to evaluate for gallsstones which can be radiolucent but these were not seen either. I encouraged her to follow up with her back surgeon to see if this might be related to nerve pathology. In the meantime she will be taking tylenol in addition to her 1-2 percocet per day (dosing instructions given) and was given a prescription for muscle relaxants.      Diagnosis:    ICD-10-CM    1. Right flank pain R10.9    2. Status post lumbar surgery Z98.890        Disposition:  discharged to home    Discharge Medications:     Medication List      Started    tiZANidine 2 MG tablet  Commonly known as:  ZANAFLEX  2 mg, Oral, 3 TIMES DAILY PRN          I, Bradley Aasen, am serving as a scribe on 1/18/2019 at 5:33 PM to personally document services performed by Aide Gannon MD based on my observations and the provider's statements to me.      Aide Gannon MD  01/18/19 2047

## 2019-01-18 NOTE — ED AVS SNAPSHOT
Emergency Department  [address] [place] [id number]  Phone:  [phone]  Fax:  [phone]                                    Malgorzata Coe   MRN: [id number]    Department:   Emergency Department   Date of Visit:  1/18/2019           After Visit Summary Signature Page    I have received my discharge instructions, and my questions have been answered. I have discussed any challenges I see with this plan with the nurse or doctor.    ..........................................................................................................................................  Patient/Patient Representative Signature      ..........................................................................................................................................  Patient Representative Print Name and Relationship to Patient    ..................................................               ................................................  Date                                   Time    ..........................................................................................................................................  Reviewed by Signature/Title    ...................................................              ..............................................  Date                                               Time          22EPIC Rev 08/18

## 2019-01-19 NOTE — DISCHARGE INSTRUCTIONS
Prescription strength dosing instructions:  - 650mg acetaminophen (tylenol) every 6 hours or 1000mg every 8 hours (maximum of 3000mg in 24 hours).  Acetaminophen is often in combination over the counter and prescription pain medications.  Be sure to include this in daily total.

## 2019-03-26 ENCOUNTER — NURSE TRIAGE (OUTPATIENT)
Dept: NURSING | Facility: CLINIC | Age: 64
End: 2019-03-26

## 2019-03-26 ENCOUNTER — HOSPITAL ENCOUNTER (EMERGENCY)
Facility: CLINIC | Age: 64
Discharge: HOME | End: 2019-03-26
Attending: PHYSICIAN ASSISTANT | Admitting: PHYSICIAN ASSISTANT
Payer: MEDICARE

## 2019-03-26 VITALS
HEART RATE: 59 BPM | SYSTOLIC BLOOD PRESSURE: 111 MMHG | WEIGHT: 155 LBS | OXYGEN SATURATION: 96 % | BODY MASS INDEX: 30.43 KG/M2 | HEIGHT: 60 IN | TEMPERATURE: 97.7 F | RESPIRATION RATE: 18 BRPM | DIASTOLIC BLOOD PRESSURE: 75 MMHG

## 2019-03-26 DIAGNOSIS — R51.9 ACUTE NONINTRACTABLE HEADACHE, UNSPECIFIED HEADACHE TYPE: ICD-10-CM

## 2019-03-26 DIAGNOSIS — R42 VERTIGO: ICD-10-CM

## 2019-03-26 PROCEDURE — 25000132 ZZH RX MED GY IP 250 OP 250 PS 637: Mod: GY | Performed by: PHYSICIAN ASSISTANT

## 2019-03-26 PROCEDURE — 96361 HYDRATE IV INFUSION ADD-ON: CPT

## 2019-03-26 PROCEDURE — 25000128 H RX IP 250 OP 636: Performed by: PHYSICIAN ASSISTANT

## 2019-03-26 PROCEDURE — 96374 THER/PROPH/DIAG INJ IV PUSH: CPT

## 2019-03-26 PROCEDURE — A9270 NON-COVERED ITEM OR SERVICE: HCPCS | Mod: GY | Performed by: PHYSICIAN ASSISTANT

## 2019-03-26 PROCEDURE — 99284 EMERGENCY DEPT VISIT MOD MDM: CPT | Mod: 25

## 2019-03-26 PROCEDURE — 96375 TX/PRO/DX INJ NEW DRUG ADDON: CPT

## 2019-03-26 RX ORDER — DIPHENHYDRAMINE HYDROCHLORIDE 50 MG/ML
25 INJECTION INTRAMUSCULAR; INTRAVENOUS ONCE
Status: COMPLETED | OUTPATIENT
Start: 2019-03-26 | End: 2019-03-26

## 2019-03-26 RX ORDER — METOCLOPRAMIDE HYDROCHLORIDE 5 MG/ML
10 INJECTION INTRAMUSCULAR; INTRAVENOUS ONCE
Status: COMPLETED | OUTPATIENT
Start: 2019-03-26 | End: 2019-03-26

## 2019-03-26 RX ORDER — ACETAMINOPHEN 500 MG
1000 TABLET ORAL ONCE
Status: COMPLETED | OUTPATIENT
Start: 2019-03-26 | End: 2019-03-26

## 2019-03-26 RX ORDER — MECLIZINE HCL 12.5 MG 12.5 MG/1
25 TABLET ORAL 3 TIMES DAILY PRN
Qty: 15 TABLET | Refills: 0 | Status: SHIPPED | OUTPATIENT
Start: 2019-03-26

## 2019-03-26 RX ORDER — MECLIZINE HYDROCHLORIDE 25 MG/1
25 TABLET ORAL ONCE
Status: COMPLETED | OUTPATIENT
Start: 2019-03-26 | End: 2019-03-26

## 2019-03-26 RX ADMIN — DIPHENHYDRAMINE HYDROCHLORIDE 25 MG: 50 INJECTION, SOLUTION INTRAMUSCULAR; INTRAVENOUS at 17:04

## 2019-03-26 RX ADMIN — ACETAMINOPHEN 1000 MG: 500 TABLET, FILM COATED ORAL at 17:05

## 2019-03-26 RX ADMIN — SODIUM CHLORIDE 1000 ML: 9 INJECTION, SOLUTION INTRAVENOUS at 17:05

## 2019-03-26 RX ADMIN — METOCLOPRAMIDE 10 MG: 5 INJECTION, SOLUTION INTRAMUSCULAR; INTRAVENOUS at 17:04

## 2019-03-26 RX ADMIN — MECLIZINE HYDROCHLORIDE 25 MG: 25 TABLET ORAL at 18:12

## 2019-03-26 ASSESSMENT — ENCOUNTER SYMPTOMS
PHOTOPHOBIA: 1
NUMBNESS: 0
FEVER: 0
DIZZINESS: 1
VOMITING: 1
NAUSEA: 1
APPETITE CHANGE: 1
CHILLS: 0
HEADACHES: 1

## 2019-03-26 ASSESSMENT — MIFFLIN-ST. JEOR: SCORE: 1189.58

## 2019-03-26 NOTE — DISCHARGE INSTRUCTIONS
*You may resume diet and activities as tolerated.  *Take medications as prescribed.  Meclizine for dizziness.  A referral has been made for vestibular rehabilitation and you should receive a call.  *This youtube video may be helpful.  Https://www.[email or web address].com/watch?v=[email or web address] (Ludy Philip MD Vertigo Treatment Oct 11)   *Return if you become worse in any way.    Discharge Instructions  Vertigo  You have been diagnosed with vertigo.  This is a dizzy feeling often described as spinning or that the room is moving around you. You will often have nausea (sick to your stomach), vomiting (throwing up), and balance problems with it.  Vertigo is usually caused by a problem in the inner ear which helps control your balance.  Many things can cause vertigo, including calcium collections in the inner ear, a virus infection of the inner ear, concussion, migraine, and some medicines.  Luckily, these causes are not life threatening and will eventually go away.  However, sometimes there is a serious problem that does not show up right away.  Generally, every Emergency Department visit should have a follow-up clinic visit with either a primary or a specialty clinic/provider. Please follow-up as instructed by your emergency provider today.  Return to the Emergency Department if you have:  New or severe headache.  Double vision (seeing two of things).  Trouble speaking or hearing.  Weakness or trouble moving/using one side of your body.  Passing out.  Numbness or tingling.  Chest pain.  Vomiting that will not stop.    Treatment:  There are several commonly prescribed medications:  Antihistamines such as meclizine (Antivert ), dimenhydrinate (Dramamine ), or diphenhydramine (Benadryl ).  Prescription anti-nausea medicines, such as promethazine (Phenergan ), metoclopramide (Reglan ), or ondansetron (Zofran ).  Prescription sedative medicines, such as diazepam (Valium ), lorazepam (Ativan ), or clonazepam (Klonopin ).  Most of these  medicines make you sleepy, and you should not take them before you work or drive. You should only take prescription medicines to treat severe vertigo symptoms, and you should stop the medicine when your symptoms improve.    Follow Up:  If you have vertigo longer than three days, it is important that you follow up either with your primary provider or an Ear, Nose, and Throat (ENT) specialist.  You may need further testing to evaluate your vertigo and you may also need ?vestibular? therapy which is a special form of physical therapy to make the vertigo go away.    If you were given a prescription for medicine here today, be sure to read all of the information (including the package insert) that comes with your prescription.  This will include important information about the medicine, its side effects, and any warnings that you need to know about.  The pharmacist who fills the prescription can provide more information and answer questions you may have about the medicine.  If you have questions or concerns that the pharmacist cannot address, please call or return to the Emergency Department.     Remember that you can always come back to the Emergency Department if you are not able to see your regular provider in the amount of time listed above, if you get any new symptoms, or if there is anything that worries you.    Discharge Instructions  Headache    You were seen today for a headache. Headaches may be caused by many different things such as muscle tension, sinus inflammation, anxiety and stress, having too little sleep, too much alcohol, some medical conditions or injury. You may have a migraine, which is caused by changes in the blood vessels in your head.  At this time your provider does not find that your headache is a sign of anything dangerous or life-threatening.  However, sometimes the signs of serious illness do not show up right away.      Generally, every Emergency Department visit should have a follow-up  clinic visit with either a primary or a specialty clinic/provider. Please follow-up as instructed by your emergency provider today.    Return to the Emergency Department if:  You get a new fever of 100.4 F or higher.  Your headache gets much worse.  You get a stiff neck with your headache.  You get a new headache that is significantly different or worse than headaches you have had before.  You are vomiting (throwing up) and cannot keep food or water down.  You have blurry or double vision or other problems with your eyes.  You have a new weakness on one side of your body.  You have difficulty with balance which is new.  You or your family thinks you are confused.  You have a seizure.    What can I do to help myself?  Pain medications - You may take a pain medication such as Tylenol  (acetaminophen), Advil , Motrin  (ibuprofen) or Aleve  (naproxen).  Take a pain reliever as soon as you notice symptoms.  Starting medications as soon as you start to have symptoms may lessen the amount of pain you have.  Relaxing in a quiet, dark room may help.  Get enough sleep and eat meals regularly.  You may need to watch for certain foods or other things which may trigger your headaches.  Keeping a journal of your headaches and possible triggers may help you and your primary provider to identify things which you should avoid which may be causing your headaches.  If you were given a prescription for medicine here today, be sure to read all of the information (including the package insert) that comes with your prescription.  This will include important information about the medicine, its side effects, and any warnings that you need to know about.  The pharmacist who fills the prescription can provide more information and answer questions you may have about the medicine.  If you have questions or concerns that the pharmacist cannot address, please call or return to the Emergency Department.   Remember that you can always come back to  the Emergency Department if you are not able to see your regular provider in the amount of time listed above, if you get any new symptoms, or if there is anything that worries you.

## 2019-03-26 NOTE — ED AVS SNAPSHOT
Emergency Department  [address] [place] [id number]  Phone:  [phone]  Fax:  [phone]                                    Malgorzata Coe   MRN: [id number]    Department:   Emergency Department   Date of Visit:  3/26/2019           After Visit Summary Signature Page    I have received my discharge instructions, and my questions have been answered. I have discussed any challenges I see with this plan with the nurse or doctor.    ..........................................................................................................................................  Patient/Patient Representative Signature      ..........................................................................................................................................  Patient Representative Print Name and Relationship to Patient    ..................................................               ................................................  Date                                   Time    ..........................................................................................................................................  Reviewed by Signature/Title    ...................................................              ..............................................  Date                                               Time          22EPIC Rev 08/18

## 2019-03-26 NOTE — TELEPHONE ENCOUNTER
Vertigo for last three days.  Recommended ER per protocol.  Myra Jenkins RN  [facility]      Reason for Disposition    [1] Dizziness (vertigo) present now AND [2] age > 60  (Exception: prior physician evaluation for this AND no different/worse than usual)    Additional Information    Negative: [1] Weakness (i.e., paralysis, loss of muscle strength) of the face, arm or leg on one side of the body AND [2] sudden onset AND [3] present now    Negative: [1] Numbness (i.e., loss of sensation) of the face, arm or leg on one side of the body AND [2] sudden onset AND [3] present now    Negative: [1] Loss of speech or garbled speech AND [2] sudden onset AND [3] present now    Negative: Difficult to awaken or acting confused  (e.g., disoriented, slurred speech)    Negative: Sounds like a life-threatening emergency to the triager    Protocols used: DIZZINESS - VERTIGO-ADULT-

## 2019-03-26 NOTE — ED PROVIDER NOTES
History     Chief Complaint:  Headache     HPI   Malgorzata Coe is a 63 year old female, with a history of migraine and vertigo, who presents to the ED for evaluation of headache. The patient reports she developed a headache and dizziness 2 days ago. She rates her headache as a 9-10/10. She has associated sound sensitivity, photophobia, nausea, vomiting, and decreased appetite. Movement worsens her symptoms. She still feels nauseous even when lying flat. She last vomited yesterday. Her symptoms are similar to her past migraines and vertigo, but is only lasting longer than typical. She has taken Meclizine, Zofran, and Compazine without alleviation of symptoms. She last took Meclizine and Zofran this morning. The patient notes she has had a MRI done that was unremarkable. The patient denies any fever, chills, numbness, tingling, or other symptoms/complaints.     Allergies:  Contrast dye: swelling    Macrodantin  Imitrex: palpitations      Medications:    Amlodipine  Abilify  Zyrtec  Esomeprazole  Folic acid  Hydroxyzine  Lamotrigine  Losartan  Melatonin  Zofran  Ranitidine  Maxalt  Vesicare  Terazosin  Venlafaxine   Meclizine  Compazine     Past Medical History:    Anxiety   Bipolar 1 disorder  GERD  Migraine  Vertigo     Past Surgical History:    Back surgery     Family History:    History reviewed. No pertinent family history.     Social History:  Smoking status: Current every day smoker, 0.50ppd  Alcohol use: No  Presents to ED alone    Marital Status:   [2]     Review of Systems   Constitutional: Positive for appetite change. Negative for chills and fever.   Eyes: Positive for photophobia.   Gastrointestinal: Positive for nausea and vomiting.   Neurological: Positive for dizziness and headaches. Negative for numbness.   All other systems reviewed and are negative.    Physical Exam     Patient Vitals for the past 24 hrs:   BP Temp Temp src Pulse Resp SpO2 Height Weight   03/26/19 1825 111/75 -- -- 59 -- 96  % -- --   03/26/19 1800 -- -- -- -- -- 94 % -- --   03/26/19 1625 136/72 97.7  F (36.5  C) Oral 63 18 99 % 1.524 m (5') 70.3 kg (155 lb)     Physical Exam  General: Alert, interactive. GCS 15.  No distress.  Head:  Scalp is atraumatic.  Eyes:  EOM intact. The pupils are equal, round, and reactive to light. No scleral icterus.  No horizontal or vertical nystagmus.  ENT:                                      Ears:  The external ears are normal. TM's non-erythematous. External canals normal.    Nose:  The external nose is normal.  Throat:  The oropharynx is normal. Mucus membranes are moist.                 Neck:  Normal range of motion. There is no rigidity.   CV:  Regular rate and rhythm. No murmur. 2+ radial pulses  Resp:  Breath sounds are clear bilaterally. Non-labored, no retractions or accessory muscle use.  GI:  Abdomen is soft, no distension, no tenderness.   MS:  Normal range of motion.   Skin:  Warm and dry.   Neuro:  Cranial nerves 2-12 intact; 5/5 strength throughout the upper and lower extremities; sensation intact to light touch throughout the upper and lower extremities;  2+ DTRs to the bilateral upper and lower extremities (biceps, BRs, patellar, achilles); normal finger to nose; normal gait, No meningismus.  Normal heel-to-shin.  Psych:  Awake. Alert & orientedx3.  Appropriate interactions.     Emergency Department Course     Interventions:  1704: Reglan 10mg IV  1704: Benadryl 25mg IV  1705: NS 1L Bolus IV  1705: Tylenol 1,000mg PO  Meclizine 25mg PO    Emergency Department Course:  Past medical records, nursing notes, and vitals reviewed.  1700: I performed an exam of the patient and obtained history, as documented above.    Patient was provided medications as noted above.    1756: I rechecked the patient.    Findings and plan explained to the Patient. Patient discharged home with instructions regarding supportive care, medications, and reasons to return. The importance of close follow-up was  reviewed.     Impression & Plan      Medical Decision Making:  Malgorzata Coe is a 63 year old female with medical history including migraines and vertigo presents with headache and notes associated dizziness for the past 2 days.  Notes dizziness is similar to past vertigo symptoms.  She has an appointment scheduled with ENT to evaluate this ongoing vertigo further.  There are no red flag symptoms to suggest need for advanced imaging including CT or MRI as this is most consistent with BPPV.  Regards to migraine, I considered a broad differential diagnosis for this patient including tension, migraine, analgesic rebound, occipital neuralgia, etc.  I also considered other less common but serious causes considered included meningitis, encephalitis, subarachnoid bleed, temporal arteritis, stroke, tumor, etc.  The patient has no signs of serious headache etiologies at this point.  No advanced imaging is indicated, nor is CT/lumbar puncture for SAH.  Patient felt improved after the above interventions and appropriate for discharge home.  Meclizine prescribed for home and referral to the [facility] diz[facility] center provided.  Ambulate in the emergency department without difficulty.  Return precautions discussed and all questions and concerns addressed prior to discharge home.    Diagnosis:    ICD-10-CM    1. Acute nonintractable headache, unspecified headache type R51    2. Vertigo R42 PHYSICAL THERAPY REFERRAL       Disposition: Patient discharged to home     Discharge Medications:     Medication List      Started    meclizine 12.5 MG tablet  Commonly known as:  ANTIVERT  25 mg, Oral, 3 TIMES DAILY PRN          Nirali Metcalf  3/26/2019    EMERGENCY DEPARTMENT    Scribe Disclosure:  INirali, am serving as a scribe at 5:00 PM on 3/26/2019 to document services personally performed by Alycia Dye PA-C based on my observations and the provider's statements to me.          Alycia Dye PA-C  03/27/19  1159

## 2019-05-08 ENCOUNTER — HOSPITAL ENCOUNTER (EMERGENCY)
Facility: CLINIC | Age: 64
Discharge: LEFT BEFORE DISCHARGE | End: 2019-05-08
Payer: COMMERCIAL

## 2019-05-08 VITALS
RESPIRATION RATE: 22 BRPM | HEART RATE: 89 BPM | WEIGHT: 160 LBS | OXYGEN SATURATION: 88 % | SYSTOLIC BLOOD PRESSURE: 127 MMHG | TEMPERATURE: 99.1 F | BODY MASS INDEX: 31.25 KG/M2 | DIASTOLIC BLOOD PRESSURE: 73 MMHG

## 2020-05-03 ENCOUNTER — OFFICE VISIT (OUTPATIENT)
Dept: URGENT CARE | Facility: URGENT CARE | Age: 65
End: 2020-05-03
Payer: MEDICARE

## 2020-05-03 VITALS
DIASTOLIC BLOOD PRESSURE: 80 MMHG | OXYGEN SATURATION: 97 % | HEART RATE: 76 BPM | TEMPERATURE: 97.8 F | WEIGHT: 149 LBS | SYSTOLIC BLOOD PRESSURE: 110 MMHG | RESPIRATION RATE: 20 BRPM | BODY MASS INDEX: 29.1 KG/M2

## 2020-05-03 DIAGNOSIS — R07.81 RIB PAIN ON LEFT SIDE: Primary | ICD-10-CM

## 2020-05-03 PROCEDURE — 99203 OFFICE O/P NEW LOW 30 MIN: CPT | Performed by: FAMILY MEDICINE

## 2020-05-03 RX ORDER — TRAMADOL HYDROCHLORIDE 50 MG/1
50 TABLET ORAL EVERY 6 HOURS PRN
Qty: 8 TABLET | Refills: 0 | Status: SHIPPED | OUTPATIENT
Start: 2020-05-03

## 2020-05-03 NOTE — PROGRESS NOTES
Subjective:   Malgorzata Coe is a 64 year old female who presents for   Chief Complaint   Patient presents with     Fall     fell 2 days ago, has lt rib area pain     She reports falling forward from ground level - hurts in the lower front rib area below the left breast area.     No hx of rib fracture.   No rapid breathing, just discomfort with deep breathing.   May be less painful to lay on the other side.   7/10 discomfort.   She has no bruising in that area. She thinks in the past she broke a rib about 10 years ago in the same area but that was more severe than this.     For pain she has tried: tylenol, oxycodone    Denies GI liver or kidney issues. No hx of GI bleeds but states after the visit she has a hx of stomach ulcers from NSAIDs    There are no active problems to display for this patient.      Current Outpatient Medications   Medication     AMLODIPINE BESYLATE PO     ARIPiprazole (ABILIFY) 30 MG tablet     cetirizine (ZYRTEC) 10 MG tablet     ESOMEPRAZOLE MAGNESIUM PO     FOLIC ACID PO     hydrOXYzine (ATARAX) 25 MG tablet     LAMOTRIGINE PO     LOSARTAN POTASSIUM PO     MELATONIN PO     Oxycodone-Acetaminophen (PERCOCET PO)     RANITIDINE HCL PO     Rizatriptan Benzoate (MAXALT PO)     Solifenacin Succinate (VESICARE PO)     TERAZOSIN HCL PO     traMADol (ULTRAM) 50 MG tablet     VENLAFAXINE HCL PO     HYDROXYZINE PAMOATE PO     meclizine (ANTIVERT) 12.5 MG tablet     ONDANSETRON PO     No current facility-administered medications for this visit.        ROS:  As above per HPI    Objective:   /80 (Cuff Size: Adult Regular)   Pulse 76   Temp 97.8  F (36.6  C) (Oral)   Resp 20   Wt 67.6 kg (149 lb)   SpO2 97%   BMI 29.10 kg/m  , Body mass index is 29.1 kg/m .  Gen:  NAD, well-nourished, sitting in chair comfortably  HEENT: EOMI, sclera anicteric, Head normocephalic, ; nares patent; moist mucous membranes  Neck: trachea midline, no thyromegaly  CV:  Hemodynamically stable  L Ribs: 7-9 tender to  touch anterior/axillary border. No bruising  Pulm:  no increased work of breathing , CTAB, no wheezes/rales/rhonchi   Extrem: no cyanosis, edema or clubbing  Skin: no obvious rashes or abnormalities  Psych: Euthymic, linear thoughts, normal rate of speech    No results found for any visits on 05/03/20.    Assessment & Plan:   Malgorzata Coe, 64 year old female who presents with:  Rib pain on left side  Patient  Requesting several times for percocet/oxycodone for discomfort. Review of her [facility] database shows Rxs in the last month for Percocet (quantity 7) and virtussin AC. I discussed that I feel more comfortable with prescribing tramadol at this time but this to be used if tylenol is not helping with discomfort.     Set expectations that this can take 2-4 weeks to heal. We deferred xray imaging as the pain is not severe and she shows no breathing difficulties to suggest pneumothorax. Palpation of the rib area does not show obvious step off or crepitus.     F/u as needed.     - traMADol (ULTRAM) 50 MG tablet  Dispense: 8 tablet; Refill: 0      Richard Cosme MD   [facility]    The use of [email or web address] dictation services may have been used to construct the content in this note; any grammatical or spelling errors are non-intentional. Please contact the author of this note directly if you are in need of any clarification.

## 2020-05-03 NOTE — PATIENT INSTRUCTIONS
Tylenol 325-650mg every 4-6 hours for pain    Tramadol when having severe pain  -- do not take with any other controlled substances/narcotics/alcohol    Expect improvement over 2-4 weeks      May help to sleep on the unaffected side      If you develop pain with breathing, difficulty breathing, lightheadedness, worsening pain -- then return to be seen

## 2022-07-02 ENCOUNTER — HOSPITAL ENCOUNTER (EMERGENCY)
Facility: CLINIC | Age: 67
Discharge: HOME | End: 2022-07-02
Attending: EMERGENCY MEDICINE | Admitting: EMERGENCY MEDICINE
Payer: MEDICARE

## 2022-07-02 VITALS
BODY MASS INDEX: 26.9 KG/M2 | HEIGHT: 60 IN | OXYGEN SATURATION: 97 % | SYSTOLIC BLOOD PRESSURE: 112 MMHG | HEART RATE: 58 BPM | DIASTOLIC BLOOD PRESSURE: 70 MMHG | WEIGHT: 137 LBS | RESPIRATION RATE: 18 BRPM | TEMPERATURE: 97.6 F

## 2022-07-02 DIAGNOSIS — K13.0 LESION OF LIP: ICD-10-CM

## 2022-07-02 DIAGNOSIS — R22.0 LIP SWELLING: ICD-10-CM

## 2022-07-02 PROBLEM — S32.009K PSEUDOARTHROSIS OF LUMBAR SPINE: Status: ACTIVE | Noted: 2022-07-02

## 2022-07-02 PROBLEM — F17.200 TOBACCO USE DISORDER: Status: ACTIVE | Noted: 2018-11-19

## 2022-07-02 PROBLEM — D64.9 ANEMIA: Status: ACTIVE | Noted: 2020-12-13

## 2022-07-02 PROBLEM — M25.572 SINUS TARSI SYNDROME OF LEFT ANKLE: Status: ACTIVE | Noted: 2018-09-13

## 2022-07-02 PROBLEM — N80.9 ENDOMETRIOSIS: Status: ACTIVE | Noted: 2022-07-02

## 2022-07-02 PROBLEM — J32.9 SINUSITIS, CHRONIC: Status: ACTIVE | Noted: 2022-07-02

## 2022-07-02 PROBLEM — K59.09 CHRONIC CONSTIPATION: Status: ACTIVE | Noted: 2018-11-19

## 2022-07-02 PROBLEM — M75.00 ADHESIVE CAPSULITIS OF SHOULDER: Status: ACTIVE | Noted: 2018-11-19

## 2022-07-02 PROBLEM — F31.9 BIPOLAR I DISORDER (H): Status: ACTIVE | Noted: 2018-11-19

## 2022-07-02 PROBLEM — K62.3 RECTAL PROLAPSE: Status: ACTIVE | Noted: 2022-07-02

## 2022-07-02 PROCEDURE — 99283 EMERGENCY DEPT VISIT LOW MDM: CPT

## 2022-07-02 RX ORDER — CHLORHEXIDINE GLUCONATE ORAL RINSE 1.2 MG/ML
15 SOLUTION DENTAL 2 TIMES DAILY
Qty: 118 ML | Refills: 1 | Status: SHIPPED | OUTPATIENT
Start: 2022-07-02

## 2022-07-02 NOTE — ED PROVIDER NOTES
History   Chief Complaint:  Oral Swelling     HPI   Malgorzata Coe is a 66 year old female who presents with a swollen lower lip. Per the patient she saw the dentist yesterday for gingivitis. They gave her novacaine but she could not afford to have the tooth pulled. Instead the dentist put her on Augmentin, which she has taken 2 doses of so far. Today she reports swelling and pain in her lower lip. She is concerned about an infection because she is immunodeficient. She denies fever. She does not believe she bit her tongue. She denies a latex allergy. She has taken Percocet for the pain.     Review of Systems   Skin:        Positive for Pain and Swelling of Lower Lip.   All other systems reviewed and are negative.    Allergies:  Amitriptyline  Iodinated Diagnostic Agents  Sumatriptan Succinate  Metronidazole  Mirtazapine  Nitrofurantoin  Nsaids  Tometin    Medications:  Amlodipine  Aripiprazole  Augmentin  Esomeprazole Magnesium  Hydroxyzine  Lamotrigine  Losartan  Meclizine  Ondansetron   Percocet  Ranitidine   Rizatriptan Benzoate  Solifenacin Succinate  Terazosin   Tramadol  Venlafaxine     Past Medical History:     Hypertension  Bipolar 1, mild  DDD  Migraines  Overweight  Colitis  Recurrent UTI  Cyst of Pancreas  Interstitial Cystitis  Anxiety  IgG3 Subclass Deficiency  Erosive Gastropathy  Gastric ulcer    Past Surgical History:    Hysterectomy  Shoulder Surgery, Right   Section  Septoplasty     Family History:    Hypertension - Mother    Social History:  Patient is unaccompanied.   Patient arrived via a private vehicle.     Physical Exam     Patient Vitals for the past 24 hrs:   BP Temp Pulse Resp SpO2 Height Weight   22 0634 -- -- -- -- -- 1.524 m (5') 62.1 kg (137 lb)   22 0629 112/70 97.6  F (36.4  C) 58 18 97 % -- --       Physical Exam   Nursing note and vitals reviewed.  Constitutional:  Alert.  Appears comfortable.   HENT:    There is a small ulceration on the tip of her tongue  on the left side.  She also has a ulceration on the inner lip on the left side.  I do not see a lot of gingival swelling or redness.  Her tongue is not markedly swollen, no pharyngeal swelling.  There is no facial swelling or redness.  She does have swelling of the left lower lip.  Eyes:    Conjunctivae are normal.      Right eye exhibits no discharge. Left eye exhibits no discharge.   Lymph:   She has some mild tenderness in the lymph nodes under her mandible on the left but they are not swollen.  Neurological:   Alert and appropriate. No focal weakness.  Skin:    Skin is warm and dry. No rash noted. No diaphoresis.   Psychiatric:   Behavior is normal. Judgment and thought content normal.                   Emergency Department Course     Emergency Department Course:    Reviewed:  I reviewed nursing notes, vitals and past medical history    Assessments:  0705 I obtained history and examined the patient as noted above.   0728 I rechecked the patient and explained findings.     Disposition:  The patient was discharged to home.     Impression & Plan       Medical Decision Making:  Patient comes in with a swollen lower lip with some ulceration on the lip and tongue.  It is all in the side where they did a block on her and were planning to pull a tooth.  She is already on Augmentin and I cannot tell what caused this.  Apparently they did the block so she was numb for a while and its possible that she may have bit her tongue and/or lip.  Its possible there could have been some irritation from something they did in the dental office but I cannot say.  Either way I do not think there is a secondary infection.  I will have her continue the Augmentin and start on some Peridex oral swish and spit twice a day treatment.  She has pain pills at home that she can use.  If she gets worse with fevers and increasing swelling and redness she will return to the ER.  Otherwise she will need to contact her dentist on Tuesday when they  open up so they can follow her up.       Ice to your lip, use the Peridex swish and spit twice a day, bland foods, continue the Augmentin as directed.  Follow-up with your doctor this week in the clinic.  Contact your dentist on Tuesday with an update.  If you develop fevers and this significantly worsens over the next 2 to 3 days, you need to be rechecked.  Use your pain pills as needed.    Diagnosis:    ICD-10-CM    1. Lesion of lip  K13.0    2. Lip swelling  R22.0        Discharge Medications:  Discharge Medication List as of 7/2/2022  7:31 AM      START taking these medications    Details   chlorhexidine (PERIDEX) 0.12 % solution Swish and spit 15 mLs in mouth 2 times daily, Disp-118 mL, R-1, Local Print             Scribe Disclosure:  Mark LANDAVERDE, am serving as a scribe at 7:04 AM on 7/2/2022 to document services personally performed by Divya White MD based on my observations and the provider's statements to me.        Divya White MD  07/02/22 0801

## 2022-07-02 NOTE — DISCHARGE INSTRUCTIONS
Ice to your lip, use the Peridex swish and spit twice a day, bland foods, continue the Augmentin as directed.  Follow-up with your doctor this week in the clinic.  Contact your dentist on Tuesday with an update.  If you develop fevers and this significantly worsens over the next 2 to 3 days, you need to be rechecked.  Use your pain pills as needed.

## 2022-07-02 NOTE — ED TRIAGE NOTES
Pt has sore on lower lip, inner aspect that started yesterday the dentist. Pt states she had dental work done, novocain injected into her gum.  Pt states she has gingervitis and currently on antibiotic.

## 2023-02-18 ENCOUNTER — APPOINTMENT (OUTPATIENT)
Dept: GENERAL RADIOLOGY | Facility: CLINIC | Age: 68
End: 2023-02-18
Attending: EMERGENCY MEDICINE
Payer: MEDICARE

## 2023-02-18 ENCOUNTER — HOSPITAL ENCOUNTER (EMERGENCY)
Facility: CLINIC | Age: 68
Discharge: HOME | End: 2023-02-18
Attending: EMERGENCY MEDICINE | Admitting: EMERGENCY MEDICINE
Payer: MEDICARE

## 2023-02-18 ENCOUNTER — APPOINTMENT (OUTPATIENT)
Dept: CT IMAGING | Facility: CLINIC | Age: 68
End: 2023-02-18
Attending: EMERGENCY MEDICINE
Payer: MEDICARE

## 2023-02-18 ENCOUNTER — NURSE TRIAGE (OUTPATIENT)
Dept: NURSING | Facility: CLINIC | Age: 68
End: 2023-02-18
Payer: MEDICARE

## 2023-02-18 VITALS
DIASTOLIC BLOOD PRESSURE: 71 MMHG | OXYGEN SATURATION: 94 % | HEART RATE: 60 BPM | HEIGHT: 60 IN | TEMPERATURE: 97.6 F | SYSTOLIC BLOOD PRESSURE: 115 MMHG | RESPIRATION RATE: 16 BRPM | BODY MASS INDEX: 27.48 KG/M2 | WEIGHT: 140 LBS

## 2023-02-18 DIAGNOSIS — K59.00 CONSTIPATION, UNSPECIFIED CONSTIPATION TYPE: ICD-10-CM

## 2023-02-18 DIAGNOSIS — K57.32 DIVERTICULITIS OF COLON: ICD-10-CM

## 2023-02-18 DIAGNOSIS — K62.3 INCOMPLETE RECTAL PROLAPSE: ICD-10-CM

## 2023-02-18 LAB
ALBUMIN UR-MCNC: NEGATIVE MG/DL
ANION GAP SERPL CALCULATED.3IONS-SCNC: 8 MMOL/L (ref 7–15)
APPEARANCE UR: CLEAR
BASOPHILS # BLD AUTO: 0 10E3/UL (ref 0–0.2)
BASOPHILS NFR BLD AUTO: 1 %
BILIRUB UR QL STRIP: NEGATIVE
BUN SERPL-MCNC: 11 MG/DL (ref 8–23)
CALCIUM SERPL-MCNC: 9.4 MG/DL (ref 8.8–10.2)
CHLORIDE SERPL-SCNC: 89 MMOL/L (ref 98–107)
COLOR UR AUTO: ABNORMAL
CREAT SERPL-MCNC: 0.89 MG/DL (ref 0.51–0.95)
DEPRECATED HCO3 PLAS-SCNC: 34 MMOL/L (ref 22–29)
EOSINOPHIL # BLD AUTO: 0.2 10E3/UL (ref 0–0.7)
EOSINOPHIL NFR BLD AUTO: 4 %
ERYTHROCYTE [DISTWIDTH] IN BLOOD BY AUTOMATED COUNT: 13.2 % (ref 10–15)
GFR SERPL CREATININE-BSD FRML MDRD: 72 ML/MIN/1.73M2
GLUCOSE SERPL-MCNC: 108 MG/DL (ref 70–99)
GLUCOSE UR STRIP-MCNC: NEGATIVE MG/DL
HCT VFR BLD AUTO: 37.1 % (ref 35–47)
HGB BLD-MCNC: 11.8 G/DL (ref 11.7–15.7)
HGB UR QL STRIP: NEGATIVE
IMM GRANULOCYTES # BLD: 0 10E3/UL
IMM GRANULOCYTES NFR BLD: 0 %
KETONES UR STRIP-MCNC: NEGATIVE MG/DL
LEUKOCYTE ESTERASE UR QL STRIP: NEGATIVE
LYMPHOCYTES # BLD AUTO: 1.6 10E3/UL (ref 0.8–5.3)
LYMPHOCYTES NFR BLD AUTO: 33 %
MCH RBC QN AUTO: 28 PG (ref 26.5–33)
MCHC RBC AUTO-ENTMCNC: 31.8 G/DL (ref 31.5–36.5)
MCV RBC AUTO: 88 FL (ref 78–100)
MONOCYTES # BLD AUTO: 0.5 10E3/UL (ref 0–1.3)
MONOCYTES NFR BLD AUTO: 11 %
NEUTROPHILS # BLD AUTO: 2.5 10E3/UL (ref 1.6–8.3)
NEUTROPHILS NFR BLD AUTO: 51 %
NITRATE UR QL: NEGATIVE
NRBC # BLD AUTO: 0 10E3/UL
NRBC BLD AUTO-RTO: 0 /100
PH UR STRIP: 7.5 [PH] (ref 5–7)
PLATELET # BLD AUTO: 256 10E3/UL (ref 150–450)
POTASSIUM SERPL-SCNC: 3.7 MMOL/L (ref 3.4–5.3)
RBC # BLD AUTO: 4.21 10E6/UL (ref 3.8–5.2)
SODIUM SERPL-SCNC: 131 MMOL/L (ref 136–145)
SP GR UR STRIP: 1.02 (ref 1–1.03)
UROBILINOGEN UR STRIP-MCNC: NORMAL MG/DL
WBC # BLD AUTO: 4.8 10E3/UL (ref 4–11)

## 2023-02-18 PROCEDURE — 99285 EMERGENCY DEPT VISIT HI MDM: CPT | Mod: 25

## 2023-02-18 PROCEDURE — 36415 COLL VENOUS BLD VENIPUNCTURE: CPT | Performed by: EMERGENCY MEDICINE

## 2023-02-18 PROCEDURE — G1010 CDSM STANSON: HCPCS

## 2023-02-18 PROCEDURE — 80048 BASIC METABOLIC PNL TOTAL CA: CPT | Performed by: EMERGENCY MEDICINE

## 2023-02-18 PROCEDURE — 250N000013 HC RX MED GY IP 250 OP 250 PS 637: Performed by: EMERGENCY MEDICINE

## 2023-02-18 PROCEDURE — 74019 RADEX ABDOMEN 2 VIEWS: CPT

## 2023-02-18 PROCEDURE — 81003 URINALYSIS AUTO W/O SCOPE: CPT | Performed by: EMERGENCY MEDICINE

## 2023-02-18 PROCEDURE — 250N000011 HC RX IP 250 OP 636: Performed by: EMERGENCY MEDICINE

## 2023-02-18 PROCEDURE — 85025 COMPLETE CBC W/AUTO DIFF WBC: CPT | Performed by: EMERGENCY MEDICINE

## 2023-02-18 RX ORDER — ONDANSETRON 4 MG/1
4 TABLET, ORALLY DISINTEGRATING ORAL ONCE
Status: COMPLETED | OUTPATIENT
Start: 2023-02-18 | End: 2023-02-18

## 2023-02-18 RX ADMIN — ONDANSETRON 4 MG: 4 TABLET, ORALLY DISINTEGRATING ORAL at 20:14

## 2023-02-18 RX ADMIN — DOCUSATE SODIUM 226 ML: 50 LIQUID ORAL at 21:49

## 2023-02-18 RX ADMIN — DOCUSATE SODIUM 226 ML: 50 LIQUID ORAL at 20:14

## 2023-02-18 ASSESSMENT — ENCOUNTER SYMPTOMS
DYSURIA: 0
SORE THROAT: 0
FEVER: 0
DIFFICULTY URINATING: 0
NUMBNESS: 0
VOMITING: 1
BACK PAIN: 0
WEAKNESS: 0
RHINORRHEA: 0
ABDOMINAL PAIN: 1
NAUSEA: 1

## 2023-02-18 ASSESSMENT — ACTIVITIES OF DAILY LIVING (ADL)
ADLS_ACUITY_SCORE: 35

## 2023-02-18 NOTE — ED PROVIDER NOTES
History     Chief Complaint:  Constipation     The history is provided by the patient.      Malgorzata Coe is a 67 year old female with a history of hypertension who presents with constipation. The patient reports that she has been constipated for the past 4-5 days. She reports that she has never been this  constipated in the past. She states that she has had intermittent stomach cramping since 2/14/23 or 2/15/23, which she states worsens during the day. She states that she has also had nausea and adds that she has had one episode of vomiting. She reports that she tried taking laxatives and stool softeners, which did not help with her constipation. She denies any trouble urinating or decreased bladder control. She also denies any numbness/tingling, fever, cough, runny nose, sore throat, leg weakness, or back pain. She states that she has had abdominal surgery before. She is not currently taking her migraine or allergy pills, as she has an upcoming allergy test. She does not drink alcohol.     Independent Historian:   None - Patient Only    Review of External Notes:   Refer to MDM    ROS:  Review of Systems   Constitutional: Negative for fever.   HENT: Negative for rhinorrhea and sore throat.    Gastrointestinal: Positive for abdominal pain, nausea and vomiting (resolved).   Genitourinary: Negative for difficulty urinating and dysuria.   Musculoskeletal: Negative for back pain.   Neurological: Negative for weakness and numbness.   All other systems reviewed and are negative.    Allergies:  Contrast Dye  Macrodantin [Nitrofurantoin]  Imitrex [Sumatriptan]   Amitriptyline  Sulfamethoxazole-Trimethoprim  Metronidazole  Mirtazapine  Nitrofurantoin  Nsaids  Tolmetin    Medications:    Melatonin  Senna  Albuterol sulfate HFA  Ativan  Famotidine  Dicyclomine  Diclofenac  Amlodipine  Lamotrigine  Trazodone  Losartan  Ariprazole  Hydroxyzine  HCl  Cyclobenzaprine  Hydrochlorothiazide  Tamsulosin  Esomeprazole  Ondansetron  Levofloxacin  Desvenlafaxine  Oxycodone-acetaminophen  Propranolol  Lorazepam    Past Medical History:    Allergic rhinitis  Anxiety  Migraine  Bipolar 1 disorder  GERD  Gastric ulcer  Impaired glucose tolerance  Ischemic colitis  Borderline personality disorder  Somatization disorder  PTSD  Endometriosis  Sinusitis  Rectal prolapse  Anemia  Vitamin B12 deficiency  Umbilical hernia  DDD  Chronic constipation  Sinus tarsi syndrome, left ankle  Epistaxis  Hypertension  Erosive gastropathy  IgG3 subclass deficiency   Tobacco use disorder  Bronchitis  Carrillo's syndrome  Acute hypokalemia  Pneumonia  Acute hyponatremia  Wound cellulitis  Osteoporosis  Cyst of pancreas  Postoperative ureteric constriction  Pulmonary nodule  TROY  Hypersomnia  Diffuse cystic mastopathy  Osteopenia  Colitis  Chronic interstitial cystitis  Somatization disorder  Recurrent infections    Past Surgical History:    Right shoulder surgery   section  Hysterectomy  Septoplasty  Lumbar spinal fusion  Hydroplasty    Family History:    Father-Aneurysm, anxiety, cataract, hypertension  Mother-Macular degeneration, dementia, cataract, hypetension    Social History:  Presents alone.   PCP: Ludwig Quintero     Physical Exam     Patient Vitals for the past 24 hrs:   BP Temp Temp src Pulse Resp SpO2 Height Weight   23 2148 115/71 -- -- 60 16 94 % -- --   23 123/78 -- -- 59 16 97 % 1.524 m (5') 63.5 kg (140 lb)   23 1436 120/77 97.6  F (36.4  C) Temporal 62 16 95 % -- --      Physical Exam   Constitutional: Well developed, nontox appearance  Head: Atraumatic.   Neck:  no stridor  Eyes: no scleral icterus  Cardiovascular: RRR, 2+ bilat radial pulses  Pulmonary/Chest: nml resp effor  Abdominal: ND, soft, equivocal suprapubic tenderness, no rebound or guarding   Rectal: No hard stool noted in rectal vault, transient rectal prolapse noted palpation  pushing with complete reduction  Ext: Warm, well perfused, no edema  Neurological: A&O, symmetric facies, moves ext x4  Skin: Skin is warm and dry.   Psychiatric: Behavior is normal. Thought content normal.   Nursing note and vitals reviewed.    Emergency Department Course     Imaging:  CT Abdomen Pelvis w/o Contrast   Final Result   IMPRESSION:    1.  Moderate/large volume formed colorectal stool correlating with constipation.   2.  Sigmoid diverticulosis with mild wall thickening. Adjacent minimal mesenteric edema and trace pelvic cul-de-sac free fluid raising possibility of mild diverticulitis.   3.  Presumed recent surgical changes periumbilical hernia repair with 3.6 x 2.5 cm subcutaneous fluid collection.         Abdomen XR, 2 vw, flat and upright   Final Result   IMPRESSION: Prominent stool throughout the colon. No evidence for small bowel obstruction. Lumbosacral fusion. Lower lungs are clear. No free air. Numerous pelvic phleboliths.            Report per radiology    Laboratory:  Labs Ordered and Resulted from Time of ED Arrival to Time of ED Departure   UA MACROSCOPIC WITH REFLEX TO MICRO AND CULTURE - Abnormal       Result Value    Color Urine Light Yellow      Appearance Urine Clear      Glucose Urine Negative      Bilirubin Urine Negative      Ketones Urine Negative      Specific Gravity Urine 1.016      Blood Urine Negative      pH Urine 7.5 (*)     Protein Albumin Urine Negative      Urobilinogen Urine Normal      Nitrite Urine Negative      Leukocyte Esterase Urine Negative     BASIC METABOLIC PANEL - Abnormal    Sodium 131 (*)     Potassium 3.7      Chloride 89 (*)     Carbon Dioxide (CO2) 34 (*)     Anion Gap 8      Urea Nitrogen 11.0      Creatinine 0.89      Calcium 9.4      Glucose 108 (*)     GFR Estimate 72     CBC WITH PLATELETS AND DIFFERENTIAL    WBC Count 4.8      RBC Count 4.21      Hemoglobin 11.8      Hematocrit 37.1      MCV 88      MCH 28.0      MCHC 31.8      RDW 13.2      Platelet  Count 256      % Neutrophils 51      % Lymphocytes 33      % Monocytes 11      % Eosinophils 4      % Basophils 1      % Immature Granulocytes 0      NRBCs per 100 WBC 0      Absolute Neutrophils 2.5      Absolute Lymphocytes 1.6      Absolute Monocytes 0.5      Absolute Eosinophils 0.2      Absolute Basophils 0.0      Absolute Immature Granulocytes 0.0      Absolute NRBCs 0.0        Procedures       Emergency Department Course & Assessments:   Interventions:  Medications   Enema Compound (docusate/mineral oil/NaPhos) NO MAG CIT PREMIX (226 mLs Rectal Given 23)   ondansetron (ZOFRAN ODT) ODT tab 4 mg (4 mg Oral Given 23)   Enema Compound (docusate/mineral oil/NaPhos) NO MAG CIT PREMIX (226 mLs Rectal Given 23)      Independent Interpretation (X-rays, CTs, rhythm strip):  Refer to [facility]    Assessments:  ED Course as of 23 2214   Sat 2023   1751 I obtained history and examined the patient as noted above.     I rechecked the patient. Patient amenable to discharge.     Social Determinants of Health affecting care:   Refer to [facility].    Disposition:  The patient was discharged to home.     Impression & Plan    CMS Diagnoses: None    Medical Decision Makin year old female presenting w/ decreased bowel movements and mild abdominal discomfort     Social determinants affecting patient's health include: Age potentially increasing risk for recurrent ED visits in the future     I reviewed medical records from general surgery office visit from 2023     DDx includes constipation, partial bowel obstruction, small bowel obstruction, less likely diverticulitis.  Doubt vascular catastrophe such as dissection or AAA given history and physical exam.  Labs significant for minimal hyponatremia, normal CBC.  Imaging sig for no air-fluid levels noted my independent interpretation.  Given the patient denies significant history of constipation lasting this long, I think would be  reasonable to obtain a CT given her surgical history and age.  CT demonstrated findings consistent with constipation.  There is subtle findings concerning for potential mild diverticulitis which seem less likely based on the patient's clinical hesitation.  Enema was attempted in the emergency department without success.  CAITLYN demonstrated no hard stool in the rectal vault therefore disimpaction was not performed.  Plan for outpatient management with [facility].  Augmentin prescription provided given concern for possible mild early diverticulitis. At this time I feel the pt is safe for discharge.  Recommendations given regarding follow up with PCP and return to the emergency department as needed for new or worsening symptoms.    Patient counseled on all results, disposition and diagnosis.  They are understanding and agreeable to plan. Patient discharged in stable condition.      Diagnosis:    ICD-10-CM    1. Constipation, unspecified constipation type  K59.00       2. Diverticulitis of colon  K57.32          Discharge Medications:  New Prescriptions    AMOXICILLIN-CLAVULANATE (AUGMENTIN) 875-125 MG TABLET    Take 1 tablet by mouth 2 times daily for 10 days    POLYETHYLENE GLYCOL (GOLYTELY) 236 G SUSPENSION    Take 4,000 mLs by mouth once for 1 dose      Scribe Disclosure:  I, MACIE PRITCHETT, am serving as a scribe at 10:08 PM on 2/18/2023 to document services personally performed by Mani Alas MD based on my observations and the provider's statements to me.     2/18/2023   Mani Alas MD Vaughn, Christopher E, MD  02/18/23 4171

## 2023-02-18 NOTE — TELEPHONE ENCOUNTER
In a lot of pain and nausea, vomited yesterday. Severely constipated. Makes her vomit. Taken 3 kinds of laxatives, enemas and nothing is working. Just brown fluid is coming out. She will come to the ER for evaluation.  Michelle Bee RN  [facility]      Reason for Disposition    [1] Vomiting AND [2] abdomen looks much more swollen than usual    Additional Information    Negative: [1] Abdomen pain is main symptom AND [2] male    Negative: [1] Abdomen pain is main symptom AND [2] adult female    Negative: Rectal bleeding or blood in stool is main symptom    Negative: Rectal pain or itching is main symptom    Negative: Constipation in a cancer patient who is currently (or recently) receiving chemotherapy or radiation therapy, or cancer patient who has metastatic or end-stage cancer and is receiving palliative care    Negative: Patient sounds very sick or weak to the triager    Protocols used: CONSTIPATION-A-AH

## 2023-02-18 NOTE — ED TRIAGE NOTES
Pt reports being constipated that started Monday. Reports very little BM since Monday.    Pt taking laxative with stool softeners with no relief.       Pt now having abd pain as well    Hx of hernia surgery in January

## 2023-02-19 ENCOUNTER — NURSE TRIAGE (OUTPATIENT)
Dept: NURSING | Facility: CLINIC | Age: 68
End: 2023-02-19
Payer: MEDICARE

## 2023-02-19 NOTE — ED NOTES
Administered pink lady enema. Patient tolerated the procedure well.   Patient defecated the enema liquid and only a couple of bullet sized soft stool. Noted blood smear at the tip of the enema and some at the stool output.   Patient did not feel relieved and agreeable to another enema or more interventions. MD updated.   Blood pressure 123/78, pulse 59, temperature 97.6  F (36.4  C), temperature source Temporal, resp. rate 16, height 1.524 m (5'), weight 63.5 kg (140 lb), SpO2 97 %.

## 2023-02-19 NOTE — ED NOTES
Administered second enema. Only one small bullet sized soft stool resulted. Noted rectal prolapse when patient was bearing down. MD updated.   Blood pressure 115/71, pulse 60, temperature 97.6  F (36.4  C), temperature source Temporal, resp. rate 16, height 1.524 m (5'), weight 63.5 kg (140 lb), SpO2 94 %.

## 2023-02-19 NOTE — TELEPHONE ENCOUNTER
She was at the ER. Saw Dr Alas. Has infection in colon. Given Augmentin. The prep given, she needs to change that because she doesn't tolerate it. Does have a prep GI gave her in the past that works.  Can Dr Alas write the order for that? I told her it has to be her PCP. That once a person leaves the ER, they need to follow up with their PCP for any changes in medications. She understands and will contact her PCP.  Michelle Bee RN  [facility]    Reason for Disposition    Caller has medicine question, adult has minor symptoms, caller declines triage, AND triager answers question    Additional Information    Negative: [1] Intentional drug overdose AND [2] suicidal thoughts or ideas    Negative: Drug overdose and triager unable to answer question    Negative: Caller requesting a renewal or refill of a medicine patient is currently taking    Negative: Caller requesting information unrelated to medicine    Negative: Caller requesting information about COVID-19 Vaccine    Negative: Caller requesting information about Emergency Contraception    Negative: Caller requesting information about Combined Birth Control Pills    Negative: Caller requesting information about Progestin Birth Control Pills    Negative: Caller requesting information about Post-Op pain or medicines    Negative: Caller requesting a prescription antibiotic (such as Penicillin) for Strep throat and has a positive culture result    Negative: Caller requesting a prescription anti-viral med (such as Tamiflu) and has influenza (flu) symptoms    Negative: Immunization reaction suspected    Negative: Rash while taking a medicine or within 3 days of stopping it    Negative: [1] Asthma and [2] having symptoms of asthma (cough, wheezing, etc.)    Negative: [1] Symptom of illness (e.g., headache, abdominal pain, earache, vomiting) AND [2] more than mild    Negative: Breastfeeding questions about mother's medicines and diet    Negative: MORE  THAN A DOUBLE DOSE of a prescription or over-the-counter (OTC) drug    Negative: [1] DOUBLE DOSE (an extra dose or lesser amount) of prescription drug AND [2] any symptoms (e.g., dizziness, nausea, pain, sleepiness)    Negative: [1] DOUBLE DOSE (an extra dose or lesser amount) of over-the-counter (OTC) drug AND [2] any symptoms (e.g., dizziness, nausea, pain, sleepiness)    Negative: Took another person's prescription drug    Negative: [1] DOUBLE DOSE (an extra dose or lesser amount) of prescription drug AND [2] NO symptoms (Exception: a double dose of antibiotics)    Negative: Diabetes drug error or overdose (e.g., took wrong type of insulin or took extra dose)    Negative: [1] Prescription not at pharmacy AND [2] was prescribed by PCP recently (Exception: triager has access to EMR and prescription is recorded there. Go to Home Care and confirm for pharmacy.)    Negative: [1] Pharmacy calling with prescription question AND [2] triager unable to answer question    Negative: [1] Caller has URGENT medicine question about med that PCP or specialist prescribed AND [2] triager unable to answer question    Negative: Medicine patch causing local rash or itching    Negative: [1] Caller has medicine question about med NOT prescribed by PCP AND [2] triager unable to answer question (e.g., compatibility with other med, storage)    Negative: Prescription request for new medicine (not a refill)    Negative: [1] Caller has NON-URGENT medicine question about med that PCP prescribed AND [2] triager unable to answer question    Negative: Caller wants to use a complementary or alternative medicine    Negative: [1] Prescription prescribed recently is not at pharmacy AND [2] triager has access to patient's EMR AND [3] prescription is recorded in the EMR    Negative: [1] DOUBLE DOSE (an extra dose or lesser amount) of over-the-counter (OTC) drug AND [2] NO symptoms    Negative: [1] DOUBLE DOSE (an extra dose or lesser amount) of  antibiotic drug AND [2] NO symptoms    Negative: Caller has medicine question only, adult not sick, AND triager answers question    Protocols used: MEDICATION QUESTION CALL-A-AH

## 2023-02-19 NOTE — ED NOTES
Discharged to home with spouse.   All belongings sent with patient.   AVS and discharge instructions given and explained to patient, verbalized understanding.

## 2023-03-29 ENCOUNTER — APPOINTMENT (OUTPATIENT)
Dept: CT IMAGING | Facility: CLINIC | Age: 68
End: 2023-03-29
Attending: EMERGENCY MEDICINE
Payer: MEDICARE

## 2023-03-29 ENCOUNTER — HOSPITAL ENCOUNTER (EMERGENCY)
Facility: CLINIC | Age: 68
Discharge: HOME | End: 2023-03-29
Attending: EMERGENCY MEDICINE | Admitting: EMERGENCY MEDICINE
Payer: MEDICARE

## 2023-03-29 VITALS
SYSTOLIC BLOOD PRESSURE: 136 MMHG | DIASTOLIC BLOOD PRESSURE: 84 MMHG | WEIGHT: 142 LBS | TEMPERATURE: 97.6 F | BODY MASS INDEX: 28.63 KG/M2 | RESPIRATION RATE: 16 BRPM | OXYGEN SATURATION: 99 % | HEIGHT: 59 IN | HEART RATE: 79 BPM

## 2023-03-29 DIAGNOSIS — R10.84 ABDOMINAL PAIN, GENERALIZED: ICD-10-CM

## 2023-03-29 DIAGNOSIS — K59.00 CONSTIPATION, UNSPECIFIED CONSTIPATION TYPE: ICD-10-CM

## 2023-03-29 LAB
ALBUMIN SERPL BCG-MCNC: 4 G/DL (ref 3.5–5.2)
ALP SERPL-CCNC: 61 U/L (ref 35–104)
ALT SERPL W P-5'-P-CCNC: 24 U/L (ref 10–35)
ANION GAP SERPL CALCULATED.3IONS-SCNC: 10 MMOL/L (ref 7–15)
AST SERPL W P-5'-P-CCNC: 30 U/L (ref 10–35)
BASOPHILS # BLD AUTO: 0 10E3/UL (ref 0–0.2)
BASOPHILS NFR BLD AUTO: 1 %
BILIRUB SERPL-MCNC: 0.3 MG/DL
BUN SERPL-MCNC: 16.5 MG/DL (ref 8–23)
CALCIUM SERPL-MCNC: 9.1 MG/DL (ref 8.8–10.2)
CHLORIDE SERPL-SCNC: 97 MMOL/L (ref 98–107)
CREAT SERPL-MCNC: 1.14 MG/DL (ref 0.51–0.95)
DEPRECATED HCO3 PLAS-SCNC: 27 MMOL/L (ref 22–29)
EOSINOPHIL # BLD AUTO: 0.1 10E3/UL (ref 0–0.7)
EOSINOPHIL NFR BLD AUTO: 1 %
ERYTHROCYTE [DISTWIDTH] IN BLOOD BY AUTOMATED COUNT: 13.9 % (ref 10–15)
GFR SERPL CREATININE-BSD FRML MDRD: 53 ML/MIN/1.73M2
GLUCOSE SERPL-MCNC: 108 MG/DL (ref 70–99)
HCT VFR BLD AUTO: 35 % (ref 35–47)
HGB BLD-MCNC: 11.4 G/DL (ref 11.7–15.7)
HOLD SPECIMEN: NORMAL
IMM GRANULOCYTES # BLD: 0 10E3/UL
IMM GRANULOCYTES NFR BLD: 0 %
LIPASE SERPL-CCNC: 26 U/L (ref 13–60)
LYMPHOCYTES # BLD AUTO: 1.6 10E3/UL (ref 0.8–5.3)
LYMPHOCYTES NFR BLD AUTO: 23 %
MCH RBC QN AUTO: 28.2 PG (ref 26.5–33)
MCHC RBC AUTO-ENTMCNC: 32.6 G/DL (ref 31.5–36.5)
MCV RBC AUTO: 87 FL (ref 78–100)
MONOCYTES # BLD AUTO: 0.6 10E3/UL (ref 0–1.3)
MONOCYTES NFR BLD AUTO: 9 %
NEUTROPHILS # BLD AUTO: 4.4 10E3/UL (ref 1.6–8.3)
NEUTROPHILS NFR BLD AUTO: 66 %
NRBC # BLD AUTO: 0 10E3/UL
NRBC BLD AUTO-RTO: 0 /100
PLATELET # BLD AUTO: 229 10E3/UL (ref 150–450)
POTASSIUM SERPL-SCNC: 4.2 MMOL/L (ref 3.4–5.3)
PROT SERPL-MCNC: 6.5 G/DL (ref 6.4–8.3)
RBC # BLD AUTO: 4.04 10E6/UL (ref 3.8–5.2)
SODIUM SERPL-SCNC: 134 MMOL/L (ref 136–145)
WBC # BLD AUTO: 6.7 10E3/UL (ref 4–11)

## 2023-03-29 PROCEDURE — 250N000011 HC RX IP 250 OP 636: Performed by: EMERGENCY MEDICINE

## 2023-03-29 PROCEDURE — 258N000003 HC RX IP 258 OP 636: Performed by: EMERGENCY MEDICINE

## 2023-03-29 PROCEDURE — 99285 EMERGENCY DEPT VISIT HI MDM: CPT | Mod: 25

## 2023-03-29 PROCEDURE — 36415 COLL VENOUS BLD VENIPUNCTURE: CPT | Performed by: EMERGENCY MEDICINE

## 2023-03-29 PROCEDURE — 83690 ASSAY OF LIPASE: CPT | Performed by: EMERGENCY MEDICINE

## 2023-03-29 PROCEDURE — 93005 ELECTROCARDIOGRAM TRACING: CPT

## 2023-03-29 PROCEDURE — 80053 COMPREHEN METABOLIC PANEL: CPT | Performed by: EMERGENCY MEDICINE

## 2023-03-29 PROCEDURE — 96374 THER/PROPH/DIAG INJ IV PUSH: CPT

## 2023-03-29 PROCEDURE — 85025 COMPLETE CBC W/AUTO DIFF WBC: CPT | Performed by: EMERGENCY MEDICINE

## 2023-03-29 PROCEDURE — G1010 CDSM STANSON: HCPCS

## 2023-03-29 PROCEDURE — 96375 TX/PRO/DX INJ NEW DRUG ADDON: CPT

## 2023-03-29 PROCEDURE — 96361 HYDRATE IV INFUSION ADD-ON: CPT

## 2023-03-29 RX ORDER — SENNOSIDES A AND B 8.6 MG/1
1 TABLET, FILM COATED ORAL 2 TIMES DAILY PRN
Qty: 20 TABLET | Refills: 0 | Status: SHIPPED | OUTPATIENT
Start: 2023-03-29

## 2023-03-29 RX ORDER — DOCUSATE SODIUM 100 MG/1
100 CAPSULE, LIQUID FILLED ORAL 2 TIMES DAILY PRN
Qty: 30 CAPSULE | Refills: 0 | Status: SHIPPED | OUTPATIENT
Start: 2023-03-29

## 2023-03-29 RX ORDER — ONDANSETRON 4 MG/1
4 TABLET, ORALLY DISINTEGRATING ORAL EVERY 6 HOURS PRN
Qty: 10 TABLET | Refills: 0 | Status: SHIPPED | OUTPATIENT
Start: 2023-03-29

## 2023-03-29 RX ORDER — ONDANSETRON 2 MG/ML
4 INJECTION INTRAMUSCULAR; INTRAVENOUS ONCE
Status: COMPLETED | OUTPATIENT
Start: 2023-03-29 | End: 2023-03-29

## 2023-03-29 RX ORDER — KETOROLAC TROMETHAMINE 15 MG/ML
15 INJECTION, SOLUTION INTRAMUSCULAR; INTRAVENOUS ONCE
Status: COMPLETED | OUTPATIENT
Start: 2023-03-29 | End: 2023-03-29

## 2023-03-29 RX ADMIN — SODIUM CHLORIDE 1000 ML: 9 INJECTION, SOLUTION INTRAVENOUS at 18:13

## 2023-03-29 RX ADMIN — KETOROLAC TROMETHAMINE 15 MG: 15 INJECTION, SOLUTION INTRAMUSCULAR; INTRAVENOUS at 18:15

## 2023-03-29 RX ADMIN — ONDANSETRON 4 MG: 2 INJECTION INTRAMUSCULAR; INTRAVENOUS at 18:15

## 2023-03-29 ASSESSMENT — ENCOUNTER SYMPTOMS
VOMITING: 1
FEVER: 0
NAUSEA: 1
BLOOD IN STOOL: 0
ABDOMINAL PAIN: 1

## 2023-03-29 ASSESSMENT — ACTIVITIES OF DAILY LIVING (ADL)
ADLS_ACUITY_SCORE: 35
ADLS_ACUITY_SCORE: 35

## 2023-03-29 NOTE — ED NOTES
Rapid Assessment Note    History:   Malgorzata Coe is a 67 year old female who presents with her  for abdominal pain. Patient reports she had diverticulitis a month ago and believes she has it again. This morning, she developed right sided abdominal pain with 10 episodes of vomiting.  The pain is now diffuse and on the right side and epigastric region more focally. In addition to having dizziness from potential dehydration. However, she denies having any blood nausea her stool. Denies ever having kidney stones. She has a history hernia repair and a  section.    Exam:   Constitutional: Well appearing.  HEENT: Atraumatic.  PERRL.  EOMI.  Moist mucous membranes.  Neck: Soft.  Supple.  No JVD.  Cardiac: Regular rate and rhythm.  No murmur or rub.  Respiratory: Clear to auscultation bilaterally.  No respiratory distress.  No wheezing, rhonchi, or rales.  Abdomen: Soft and nontender.  No rebound or guarding.  Nondistended.  Musculoskeletal: No edema.  Normal range of motion.  Neurologic: Alert and oriented x3.  Normal tone and bulk.  Cranial nerves II through XII intact.  5/5 strength in bilateral upper and lower extremities.  Sensation to light touch intact throughout.  Normal gait.  Skin: No rashes.  No edema.  Psych: Normal affect.  Normal behavior.        Plan of Care:   I evaluated the patient and developed an initial plan of care. I discussed this plan and explained that I, or one of my partners, would be returning to complete the evaluation.         I, Deborahreid Audrey, am serving as a scribe to document services personally performed by Dr. Jb MD, based on my observations and the provider's statements to me.    3/29/2023  [employer]    Portions of this medical record were completed by a scribe. UPON MY REVIEW AND AUTHENTICATION BY ELECTRONIC SIGNATURE, this confirms (a) I performed the applicable clinical services, and (b) the record is accurate.      Jb  Pantera CARLSON MD  03/29/23 1817

## 2023-03-29 NOTE — ED TRIAGE NOTES
Pt has had abd pain, nause and vomiting that has gotten worse as the day has gone on     Pt was seen recently for diverticulitis and states this feels the same

## 2023-03-30 NOTE — DISCHARGE INSTRUCTIONS
Return to the emergency department or seek medical care as instructed if your symptoms fail to improve or significantly worsen.    Take Acetaminophen (aka Tylenol) and/or ibuprofen (aka Motrin/Advil) as needed for symptom/pain relief; use as directed.    Take zofran as need for nausea; use as directed.    Miralax daily as needed for constipation. Then Colace as needed if still constipated. Add senna if constipation continues    Follow-up as indicated on page 1.  Maintain adequate hydration and get plenty of rest.

## 2023-03-30 NOTE — ED PROVIDER NOTES
History     Chief Complaint:  Abdominal Pain       The history is provided by the patient.      Malgorzata Coe is a 67 year old female who presents with abdominal pain. The patient reports nausea, vomiting, and diffuse abdominal cramping throughout the day today. She reports having a bowel movement today, and the stool was hard.  No urinary symptoms.  She denies noticing any blood in her stool or having a fever. She reports a history of diverticulitis, and was concerned for this today. She reports having relief from the Zofran, Toradol, and fluids that she received in the ED today prior to my assessment. She reports medical history of well controlled bipolar disorder, immunodeficiency, gastritis and an ulcer.     Independent Historian:   None - Patient Only    ROS:  Review of Systems   Constitutional: Negative for fever.   Gastrointestinal: Positive for abdominal pain, nausea and vomiting. Negative for blood in stool.   All other systems reviewed and are negative.      Allergies:  Contrast Dye  Macrodantin [Nitrofurantoin]  Imitrex [Sumatriptan]     Medications:    Amlodipine  Abilify  Zyrtec  Esomeprazole  Folic acid  Atarax  Lamotrigine  Losartan  Meclizine   Maxalt  Ranitidine  Tramadol   Terazosin  Venlafaxine    Past Medical History:    Bipolar 1 disorder  Chronic constipation  Chronic interstitial cystitis  Tobacco use disorder  Urge incontinence  Essential hypertension  Erosive gastropathy   IgG3 subclass deficiency  Allergic rhinitis  GERD  Ischemic colitis   Recurrent UTIs  Migraines  Generalized anxiety disorder  TROY  Borderline personality disorder  Pseudoarthritis of lumbar spine  DDD  Somatization disorder  Prolonged Q-T interval on ECG  Umbilical hernia  Gastric ulcer  Endometriosis  Sinusitis, chronic  Rectal prolapse  Carrillo's syndrome  Osteoporosis  Pulmonary nodule  Pneumonia  Pancreatic cyst    Past Surgical History:    Lumbar fusion  BOAZ and BSO  Rotator cuff repair    Nasal  "septum surgery  Ankle surgery  Lumbar drain    Family History:    Father: aneurysm, anxiety, cataract, hypertension  Mother: dementia, macular degeneration, anxiety, cataract, hypertension  Brother: CAD, asthma    Social History:  Presents to the ED with her   PCP: Ludwig Quintero     Physical Exam     Patient Vitals for the past 24 hrs:   BP Temp Temp src Pulse Resp SpO2 Height Weight   03/29/23 1803 136/84 97.6  F (36.4  C) Temporal 79 16 99 % 1.499 m (4' 11\") 64.4 kg (142 lb)        Physical Exam  General: Alert and cooperative with exam. Patient in mild distress. Normal mentation.  Well-appearing.  Head:  Scalp is NC/AT  Eyes:  No scleral icterus, PERRL  ENT:  The external nose and ears are normal.   Neck:  Normal range of motion without rigidity.  CV:  Regular rate and rhythm    No pathologic murmur   Resp:  Breath sounds are clear bilaterally    Non-labored, no retractions or accessory muscle use  GI:  Abdomen is soft, no distension, no tenderness. No peritoneal signs  MS:  No lower extremity edema   Skin:  Warm and dry, No rash or lesions noted.  Neuro: Oriented x 3. No gross motor deficits.    Emergency Department Course     Imaging:  CT Abdomen Pelvis w/o Contrast   Final Result   IMPRESSION:    1.  Obstipation with some slight stranding of the fat adjacent to the sigmoid colon which is likely reactive in nature.   2.  Diverticulosis with no evidence for diverticulitis.   3.  1 x 1 mm nonobstructive stone in the right kidney.         Report per radiology    Laboratory:  Labs Ordered and Resulted from Time of ED Arrival to Time of ED Departure   COMPREHENSIVE METABOLIC PANEL - Abnormal       Result Value    Sodium 134 (*)     Potassium 4.2      Chloride 97 (*)     Carbon Dioxide (CO2) 27      Anion Gap 10      Urea Nitrogen 16.5      Creatinine 1.14 (*)     Calcium 9.1      Glucose 108 (*)     Alkaline Phosphatase 61      AST 30      ALT 24      Protein Total 6.5      Albumin 4.0      Bilirubin Total " 0.3      GFR Estimate 53 (*)    CBC WITH PLATELETS AND DIFFERENTIAL - Abnormal    WBC Count 6.7      RBC Count 4.04      Hemoglobin 11.4 (*)     Hematocrit 35.0      MCV 87      MCH 28.2      MCHC 32.6      RDW 13.9      Platelet Count 229      % Neutrophils 66      % Lymphocytes 23      % Monocytes 9      % Eosinophils 1      % Basophils 1      % Immature Granulocytes 0      NRBCs per 100 WBC 0      Absolute Neutrophils 4.4      Absolute Lymphocytes 1.6      Absolute Monocytes 0.6      Absolute Eosinophils 0.1      Absolute Basophils 0.0      Absolute Immature Granulocytes 0.0      Absolute NRBCs 0.0     LIPASE - Normal    Lipase 26          Emergency Department Course & Assessments:       Interventions:  Medications   0.9% sodium chloride BOLUS (0 mLs Intravenous Stopped 3/29/23 2100)   ondansetron (ZOFRAN) injection 4 mg (4 mg Intravenous $Given 3/29/23 1815)   ketorolac (TORADOL) injection 15 mg (15 mg Intravenous $Given 3/29/23 1815)        Assessments:  2038 I obtained history and examined the patient as noted above.     Independent Interpretation (X-rays, CTs, rhythm strip):  None    Consultations/Discussion of Management or Tests:  None     Social Determinants of Health affecting care:   None    Disposition:  The patient was discharged to home.     Impression & Plan      Medical Decision Making:  Malgorzata Coe is a 67 year old female who presents for evaluation of nausea, vomiting, and abdominal pain.  Patient was seen in triage and provided IV fluids, Zofran, and Toradol with significant improvement.  On my evaluation abdominal exam is benign and patient notes that symptoms have significantly improved.  Vital signs normal.  She is tolerating oral intake.  Labs without significant acute findings.  Patient denies urinary symptoms or blood in urine/stool.  CT imaging was obtained and demonstrates evidence of mild obstipation and patient endorses constipation with hard bowel movement earlier today.  Signs  and symptoms are consistent with constipation.   Plan for discharge home with bowel regimen instructions. Patient is agreeable with this and questions are answered.  Recommended close follow-up with PCP as needed.  Return precautions discussed.    Diagnosis:    ICD-10-CM    1. Abdominal pain, generalized  R10.84       2. Constipation, unspecified constipation type  K59.00            Discharge Medications:  New Prescriptions    DOCUSATE SODIUM (COLACE) 100 MG CAPSULE    Take 1 capsule (100 mg) by mouth 2 times daily as needed for constipation    ONDANSETRON (ZOFRAN ODT) 4 MG ODT TAB    Take 1 tablet (4 mg) by mouth every 6 hours as needed for nausea    SENNA (SENOKOT) 8.6 MG TABLET    Take 1 tablet by mouth 2 times daily as needed for constipation     Scribe Disclosure:  I, Amina Chavez, am serving as a scribe at 8:48 PM on 3/29/2023 to document services personally performed by Mani Badillo DO based on my observations and the provider's statements to me.   3/29/2023        Mani Badillo DO  03/29/23 2119       Mani Badillo,   03/29/23 2119

## 2024-08-31 ENCOUNTER — APPOINTMENT (OUTPATIENT)
Dept: GENERAL RADIOLOGY | Facility: CLINIC | Age: 69
End: 2024-08-31
Attending: EMERGENCY MEDICINE
Payer: MEDICARE

## 2024-08-31 ENCOUNTER — HOSPITAL ENCOUNTER (EMERGENCY)
Facility: CLINIC | Age: 69
Discharge: HOME | End: 2024-08-31
Attending: EMERGENCY MEDICINE | Admitting: EMERGENCY MEDICINE
Payer: MEDICARE

## 2024-08-31 VITALS
RESPIRATION RATE: 18 BRPM | TEMPERATURE: 97.6 F | DIASTOLIC BLOOD PRESSURE: 95 MMHG | BODY MASS INDEX: 28.48 KG/M2 | HEART RATE: 64 BPM | SYSTOLIC BLOOD PRESSURE: 153 MMHG | WEIGHT: 141 LBS | OXYGEN SATURATION: 95 %

## 2024-08-31 DIAGNOSIS — J06.9 VIRAL URI WITH COUGH: ICD-10-CM

## 2024-08-31 DIAGNOSIS — R06.2 WHEEZING: ICD-10-CM

## 2024-08-31 LAB
ANION GAP SERPL CALCULATED.3IONS-SCNC: 11 MMOL/L (ref 7–15)
ATRIAL RATE - MUSE: 65 BPM
BASOPHILS # BLD AUTO: 0 10E3/UL (ref 0–0.2)
BASOPHILS NFR BLD AUTO: 1 %
BUN SERPL-MCNC: 16.6 MG/DL (ref 8–23)
CALCIUM SERPL-MCNC: 9.1 MG/DL (ref 8.8–10.4)
CHLORIDE SERPL-SCNC: 101 MMOL/L (ref 98–107)
CREAT SERPL-MCNC: 0.93 MG/DL (ref 0.51–0.95)
DIASTOLIC BLOOD PRESSURE - MUSE: NORMAL MMHG
EGFRCR SERPLBLD CKD-EPI 2021: 67 ML/MIN/1.73M2
EOSINOPHIL # BLD AUTO: 0.3 10E3/UL (ref 0–0.7)
EOSINOPHIL NFR BLD AUTO: 6 %
ERYTHROCYTE [DISTWIDTH] IN BLOOD BY AUTOMATED COUNT: 13.7 % (ref 10–15)
GLUCOSE SERPL-MCNC: 97 MG/DL (ref 70–99)
HCO3 SERPL-SCNC: 27 MMOL/L (ref 22–29)
HCT VFR BLD AUTO: 36.5 % (ref 35–47)
HGB BLD-MCNC: 11.9 G/DL (ref 11.7–15.7)
HOLD SPECIMEN: NORMAL
IMM GRANULOCYTES # BLD: 0 10E3/UL
IMM GRANULOCYTES NFR BLD: 0 %
INTERPRETATION ECG - MUSE: NORMAL
LYMPHOCYTES # BLD AUTO: 1.6 10E3/UL (ref 0.8–5.3)
LYMPHOCYTES NFR BLD AUTO: 27 %
MCH RBC QN AUTO: 28.7 PG (ref 26.5–33)
MCHC RBC AUTO-ENTMCNC: 32.6 G/DL (ref 31.5–36.5)
MCV RBC AUTO: 88 FL (ref 78–100)
MONOCYTES # BLD AUTO: 0.7 10E3/UL (ref 0–1.3)
MONOCYTES NFR BLD AUTO: 13 %
NEUTROPHILS # BLD AUTO: 3 10E3/UL (ref 1.6–8.3)
NEUTROPHILS NFR BLD AUTO: 53 %
NRBC # BLD AUTO: 0 10E3/UL
NRBC BLD AUTO-RTO: 0 /100
P AXIS - MUSE: 62 DEGREES
PLATELET # BLD AUTO: 252 10E3/UL (ref 150–450)
POTASSIUM SERPL-SCNC: 4 MMOL/L (ref 3.4–5.3)
PR INTERVAL - MUSE: 186 MS
QRS DURATION - MUSE: 88 MS
QT - MUSE: 452 MS
QTC - MUSE: 470 MS
R AXIS - MUSE: 264 DEGREES
RBC # BLD AUTO: 4.15 10E6/UL (ref 3.8–5.2)
SARS-COV-2 RNA RESP QL NAA+PROBE: NEGATIVE
SODIUM SERPL-SCNC: 139 MMOL/L (ref 135–145)
SYSTOLIC BLOOD PRESSURE - MUSE: NORMAL MMHG
T AXIS - MUSE: 25 DEGREES
VENTRICULAR RATE- MUSE: 65 BPM
WBC # BLD AUTO: 5.7 10E3/UL (ref 4–11)

## 2024-08-31 PROCEDURE — 80048 BASIC METABOLIC PNL TOTAL CA: CPT | Performed by: EMERGENCY MEDICINE

## 2024-08-31 PROCEDURE — 87635 SARS-COV-2 COVID-19 AMP PRB: CPT | Performed by: EMERGENCY MEDICINE

## 2024-08-31 PROCEDURE — 250N000011 HC RX IP 250 OP 636: Performed by: EMERGENCY MEDICINE

## 2024-08-31 PROCEDURE — 96374 THER/PROPH/DIAG INJ IV PUSH: CPT

## 2024-08-31 PROCEDURE — 93005 ELECTROCARDIOGRAM TRACING: CPT

## 2024-08-31 PROCEDURE — 99285 EMERGENCY DEPT VISIT HI MDM: CPT | Mod: 25

## 2024-08-31 PROCEDURE — 85025 COMPLETE CBC W/AUTO DIFF WBC: CPT | Performed by: EMERGENCY MEDICINE

## 2024-08-31 PROCEDURE — 71046 X-RAY EXAM CHEST 2 VIEWS: CPT

## 2024-08-31 PROCEDURE — 36415 COLL VENOUS BLD VENIPUNCTURE: CPT | Performed by: EMERGENCY MEDICINE

## 2024-08-31 PROCEDURE — 250N000009 HC RX 250: Performed by: EMERGENCY MEDICINE

## 2024-08-31 PROCEDURE — 94640 AIRWAY INHALATION TREATMENT: CPT

## 2024-08-31 RX ORDER — PREDNISONE 20 MG/1
TABLET ORAL
Qty: 10 TABLET | Refills: 0 | Status: SHIPPED | OUTPATIENT
Start: 2024-08-31

## 2024-08-31 RX ORDER — IPRATROPIUM BROMIDE AND ALBUTEROL SULFATE 2.5; .5 MG/3ML; MG/3ML
3 SOLUTION RESPIRATORY (INHALATION) ONCE
Status: COMPLETED | OUTPATIENT
Start: 2024-08-31 | End: 2024-08-31

## 2024-08-31 RX ORDER — METHYLPREDNISOLONE SODIUM SUCCINATE 125 MG/2ML
125 INJECTION, POWDER, LYOPHILIZED, FOR SOLUTION INTRAMUSCULAR; INTRAVENOUS ONCE
Status: COMPLETED | OUTPATIENT
Start: 2024-08-31 | End: 2024-08-31

## 2024-08-31 RX ADMIN — METHYLPREDNISOLONE SODIUM SUCCINATE 125 MG: 125 INJECTION, POWDER, FOR SOLUTION INTRAMUSCULAR; INTRAVENOUS at 20:48

## 2024-08-31 RX ADMIN — IPRATROPIUM BROMIDE AND ALBUTEROL SULFATE 3 ML: .5; 3 SOLUTION RESPIRATORY (INHALATION) at 20:48

## 2024-08-31 ASSESSMENT — COLUMBIA-SUICIDE SEVERITY RATING SCALE - C-SSRS
1. IN THE PAST MONTH, HAVE YOU WISHED YOU WERE DEAD OR WISHED YOU COULD GO TO SLEEP AND NOT WAKE UP?: NO
6. HAVE YOU EVER DONE ANYTHING, STARTED TO DO ANYTHING, OR PREPARED TO DO ANYTHING TO END YOUR LIFE?: NO
2. HAVE YOU ACTUALLY HAD ANY THOUGHTS OF KILLING YOURSELF IN THE PAST MONTH?: NO

## 2024-08-31 ASSESSMENT — ACTIVITIES OF DAILY LIVING (ADL)
ADLS_ACUITY_SCORE: 35

## 2024-08-31 NOTE — ED TRIAGE NOTES
Pt states she has been sick for 3 weeks states she had a sinus infection that spread to bronchitis.  Pt is currently on doxycycline.  Pt states she still has myalgia, sob, and fatigue.

## 2024-09-01 NOTE — ED PROVIDER NOTES
Emergency Department Note      History of Present Illness     Chief Complaint   Shortness of Breath, Generalized Weakness, and Musculoskeletal Problem      HPI   Malgorzata Coe is a 68 year old female with a history of bipolar 1 disorder, borderline personality, and somatization disorder presenting to the ED for evaluation of shortness of breath and generalized weakness. The patient reports that she has had a sinus infection for the last three weeks that has developed into bronchitis. She is currently endorsing body aches, shortness of breath, and increased fatigue. The patient is currently on a course of doxycycline for the past week and has been using inhalers. No recent fever, nausea, vomiting, chest pain, pressure, urinary symptoms, or diarrhea. She quit smoking 4 years ago.    Independent Historian   None    Review of External Notes    I reviewed her nurse triage note from earlier today.    Past Medical History     Medical History and Problem List   Anxiety  Bipolar 1 disorder  GERD  Migraine   Adhesive capsulitis  Allergic rhinitis  Anemia  Borderline personality disorder  Chronic constipation  Chronic PTSD  Ischemic colitis  IgG3 subclass deficiency  Gastric ulcer  Impaired glucose tolerance  TROY  Prolonged QT  Pseudoarthrosis  Rectal prolapse  Endometriosis  Sinus tarsi syndrome  Somatization disorder  Tobacco use disorder   Hypertension  Unspecified psychosis  Umbilical hernia  Diverticulosis   Vitamin B12 deficiency   Hypokalemia  Hyponatremia   Carrillo's syndrome  Pancreatic cyst and pseudocyst   Depression  Chronic interstitial cystitis     Medications   Amlodipine besylate  Augmentin  Abilify  Zyrtec  Colace  Esomeprazole magnesium  Folic acid  Atarax  Hydroxyzine pamoate  Lamotrigine  Losartan potassium  Antivert  Percocet  Ranitidine  Maxalt  Senokot  Vesicare  Terazosin  Ultram  Venlafaxine   Ativan  Klonopin  Symbicort  Monodox  Amerge     Surgical History   Lumbar puncture  Back surgery   BOAZ  and BSO  Rotator cuff repair    Posterior fusion lumbar spine  Nasal septum surgery  Ankle surgery  Lumbar drain   Hernia repair     Physical Exam     Patient Vitals for the past 24 hrs:   BP Temp Temp src Pulse Resp SpO2 Weight   24 1843 (!) 153/95 97.6  F (36.4  C) Temporal 64 18 95 % 64 kg (141 lb)     Physical Exam  Constitutional: Vital signs reviewed as above. Speaking in full sentences.   General: Alert  HEENT: Moist mucous membranes. Mild maxillary sinus tenderness bilaterally.   Eyes: Conjunctiva normal.   Neck: Normal range of motion  Cardiovascular: Regular rate, Regular rhythm and normal heart sounds.  No MRG  Pulmonary/Chest: Bilateral expiratory wheezing. Effort normal and breath sounds normal. No respiratory distress. Patient has no rales.   Abdominal: Soft. Positive bowel sounds. No MRG.  Musculoskeletal/Extremities: Full ROM.  Endo: No pitting edema  Neurological: Alert, no focal deficits.  Skin: Skin is warm and dry.   Psychiatric: Pleasant    Diagnostics     Lab Results   Labs Ordered and Resulted from Time of ED Arrival to Time of ED Departure   BASIC METABOLIC PANEL - Normal       Result Value    Sodium 139      Potassium 4.0      Chloride 101      Carbon Dioxide (CO2) 27      Anion Gap 11      Urea Nitrogen 16.6      Creatinine 0.93      GFR Estimate 67      Calcium 9.1      Glucose 97     COVID-19 VIRUS (CORONAVIRUS) BY PCR - Normal    SARS CoV2 PCR Negative     CBC WITH PLATELETS AND DIFFERENTIAL    WBC Count 5.7      RBC Count 4.15      Hemoglobin 11.9      Hematocrit 36.5      MCV 88      MCH 28.7      MCHC 32.6      RDW 13.7      Platelet Count 252      % Neutrophils 53      % Lymphocytes 27      % Monocytes 13      % Eosinophils 6      % Basophils 1      % Immature Granulocytes 0      NRBCs per 100 WBC 0      Absolute Neutrophils 3.0      Absolute Lymphocytes 1.6      Absolute Monocytes 0.7      Absolute Eosinophils 0.3      Absolute Basophils 0.0      Absolute Immature  Granulocytes 0.0      Absolute NRBCs 0.0       Imaging   Chest XR,  PA & LAT   Final Result   IMPRESSION: Mildly tortuous aorta. Cardiac silhouette within normal limits. No pneumothorax or pleural effusion. No focal consolidation. No acute osseous abnormality.      Report per radiology.     EKG   ECG taken at 1900, ECG read at 2036  Normal sinus rhythm  Right superior axis deviation   Rate 65 bpm. IA interval 186 ms. QRS duration 88 ms. QT/QTc 452/470 ms. P-R-T axes 62 264 25.    Independent Interpretation   CXR: No pneumothorax, infiltrate, pleural effusion, pulmonary edema, cardiomegaly, or mediastinal widening.  EKG from today shows sinus rhythm, rate of 65, no acute concerning ST or T wave changes.    ED Course      Medications Administered   Medications   ipratropium - albuterol 0.5 mg/2.5 mg/3 mL (DUONEB) neb solution 3 mL (3 mLs Nebulization $Given 8/31/24 2048)   methylPREDNISolone Na Suc (solu-MEDROL) injection 125 mg (125 mg Intravenous $Given 8/31/24 2048)     Discussion of Management   None    ED Course   ED Course as of 08/31/24 2222   Sat Aug 31, 2024   2034 I obtained history and examined the patient as noted above.     2212 I rechecked the patient and discussed discharge.      Additional Documentation  None    Medical Decision Making / Diagnosis     CMS Diagnoses: None    MIPS       None    MDM   Malgorzata Coe is a 68 year old female who is now been sick for approximately 3 weeks.  It sounds like she was initially told she had a sinus infection and bronchitis.  She is on her second antibiotic it does not like she is getting any better.  She continues a mild cough with occasional productivity.  No body aches or sore throat.  No nausea or vomiting.  Chest x-ray here was negative for any signs of infiltrate.  She did have bilateral expiratory wheezing and was given a DuoNeb and a dose of Solu-Medrol.  She was feeling better.  COVID swab was negative.  CBC and BMP were both completely unremarkable.   Patient was reassured.  This is likely a viral illness.  She has albuterol at home which she does need to take.  I will give her 5 days of prednisone as well.  Follow-up as symptoms warrant.    Disposition   The patient was discharged.     Diagnosis     ICD-10-CM    1. Viral URI with cough  J06.9       2. Wheezing  R06.2          Discharge Medications   New Prescriptions    PREDNISONE (DELTASONE) 20 MG TABLET    Take two tablets (= 40mg) each day for 5 (five) days     Scribe Disclosure:  I, Gill Shetty, am serving as a scribe at 8:34 PM on 8/31/2024 to document services personally performed by Castro Pitts MD based on my observations and the provider's statements to me.        Castro Pitts MD  08/31/24 [id number]

## 2024-10-27 ENCOUNTER — APPOINTMENT (OUTPATIENT)
Dept: CT IMAGING | Facility: CLINIC | Age: 69
End: 2024-10-27
Attending: EMERGENCY MEDICINE
Payer: MEDICARE

## 2024-10-27 ENCOUNTER — HOSPITAL ENCOUNTER (EMERGENCY)
Facility: CLINIC | Age: 69
Discharge: HOME | End: 2024-10-27
Attending: EMERGENCY MEDICINE | Admitting: EMERGENCY MEDICINE
Payer: MEDICARE

## 2024-10-27 VITALS
HEART RATE: 70 BPM | RESPIRATION RATE: 16 BRPM | OXYGEN SATURATION: 93 % | SYSTOLIC BLOOD PRESSURE: 144 MMHG | DIASTOLIC BLOOD PRESSURE: 80 MMHG | TEMPERATURE: 97.6 F

## 2024-10-27 DIAGNOSIS — K57.32 DIVERTICULITIS OF COLON: ICD-10-CM

## 2024-10-27 DIAGNOSIS — K59.03 DRUG-INDUCED CONSTIPATION: ICD-10-CM

## 2024-10-27 LAB
ALBUMIN SERPL BCG-MCNC: 4.1 G/DL (ref 3.5–5.2)
ALBUMIN UR-MCNC: 10 MG/DL
ALP SERPL-CCNC: 101 U/L (ref 40–150)
ALT SERPL W P-5'-P-CCNC: 15 U/L (ref 0–50)
ANION GAP SERPL CALCULATED.3IONS-SCNC: 7 MMOL/L (ref 7–15)
APPEARANCE UR: CLEAR
AST SERPL W P-5'-P-CCNC: 27 U/L (ref 0–45)
BASOPHILS # BLD AUTO: 0 10E3/UL (ref 0–0.2)
BASOPHILS NFR BLD AUTO: 0 %
BILIRUB SERPL-MCNC: 0.3 MG/DL
BILIRUB UR QL STRIP: NEGATIVE
BUN SERPL-MCNC: 15.1 MG/DL (ref 8–23)
CALCIUM SERPL-MCNC: 9.2 MG/DL (ref 8.8–10.4)
CHLORIDE SERPL-SCNC: 93 MMOL/L (ref 98–107)
COLOR UR AUTO: ABNORMAL
CREAT SERPL-MCNC: 0.91 MG/DL (ref 0.51–0.95)
EGFRCR SERPLBLD CKD-EPI 2021: 69 ML/MIN/1.73M2
EOSINOPHIL # BLD AUTO: 0.1 10E3/UL (ref 0–0.7)
EOSINOPHIL NFR BLD AUTO: 1 %
ERYTHROCYTE [DISTWIDTH] IN BLOOD BY AUTOMATED COUNT: 15.7 % (ref 10–15)
GLUCOSE SERPL-MCNC: 106 MG/DL (ref 70–99)
GLUCOSE UR STRIP-MCNC: NEGATIVE MG/DL
HCO3 SERPL-SCNC: 32 MMOL/L (ref 22–29)
HCT VFR BLD AUTO: 35.1 % (ref 35–47)
HGB BLD-MCNC: 11.1 G/DL (ref 11.7–15.7)
HGB UR QL STRIP: NEGATIVE
IMM GRANULOCYTES # BLD: 0 10E3/UL
IMM GRANULOCYTES NFR BLD: 1 %
KETONES UR STRIP-MCNC: 10 MG/DL
LEUKOCYTE ESTERASE UR QL STRIP: NEGATIVE
LIPASE SERPL-CCNC: 32 U/L (ref 13–60)
LYMPHOCYTES # BLD AUTO: 1 10E3/UL (ref 0.8–5.3)
LYMPHOCYTES NFR BLD AUTO: 13 %
MCH RBC QN AUTO: 28.2 PG (ref 26.5–33)
MCHC RBC AUTO-ENTMCNC: 31.6 G/DL (ref 31.5–36.5)
MCV RBC AUTO: 89 FL (ref 78–100)
MONOCYTES # BLD AUTO: 0.7 10E3/UL (ref 0–1.3)
MONOCYTES NFR BLD AUTO: 9 %
NEUTROPHILS # BLD AUTO: 5.8 10E3/UL (ref 1.6–8.3)
NEUTROPHILS NFR BLD AUTO: 76 %
NITRATE UR QL: NEGATIVE
NRBC # BLD AUTO: 0 10E3/UL
NRBC BLD AUTO-RTO: 0 /100
PH UR STRIP: 7 [PH] (ref 5–7)
PLATELET # BLD AUTO: 326 10E3/UL (ref 150–450)
POTASSIUM SERPL-SCNC: 4.3 MMOL/L (ref 3.4–5.3)
PROT SERPL-MCNC: 7.1 G/DL (ref 6.4–8.3)
RBC # BLD AUTO: 3.93 10E6/UL (ref 3.8–5.2)
RBC URINE: 0 /HPF
SODIUM SERPL-SCNC: 132 MMOL/L (ref 135–145)
SP GR UR STRIP: 1.02 (ref 1–1.03)
SQUAMOUS EPITHELIAL: <1 /HPF
UROBILINOGEN UR STRIP-MCNC: NORMAL MG/DL
WBC # BLD AUTO: 7.7 10E3/UL (ref 4–11)
WBC URINE: <1 /HPF

## 2024-10-27 PROCEDURE — 85004 AUTOMATED DIFF WBC COUNT: CPT | Performed by: EMERGENCY MEDICINE

## 2024-10-27 PROCEDURE — 74176 CT ABD & PELVIS W/O CONTRAST: CPT | Mod: MA

## 2024-10-27 PROCEDURE — 96374 THER/PROPH/DIAG INJ IV PUSH: CPT

## 2024-10-27 PROCEDURE — 80053 COMPREHEN METABOLIC PANEL: CPT | Performed by: EMERGENCY MEDICINE

## 2024-10-27 PROCEDURE — 250N000011 HC RX IP 250 OP 636: Performed by: EMERGENCY MEDICINE

## 2024-10-27 PROCEDURE — 99285 EMERGENCY DEPT VISIT HI MDM: CPT | Mod: 25

## 2024-10-27 PROCEDURE — 81003 URINALYSIS AUTO W/O SCOPE: CPT | Performed by: EMERGENCY MEDICINE

## 2024-10-27 PROCEDURE — 36415 COLL VENOUS BLD VENIPUNCTURE: CPT | Performed by: EMERGENCY MEDICINE

## 2024-10-27 PROCEDURE — 83690 ASSAY OF LIPASE: CPT | Performed by: EMERGENCY MEDICINE

## 2024-10-27 PROCEDURE — 85014 HEMATOCRIT: CPT | Performed by: EMERGENCY MEDICINE

## 2024-10-27 RX ORDER — ONDANSETRON 2 MG/ML
4 INJECTION INTRAMUSCULAR; INTRAVENOUS EVERY 30 MIN PRN
Status: DISCONTINUED | OUTPATIENT
Start: 2024-10-27 | End: 2024-10-28 | Stop reason: HOSPADM

## 2024-10-27 RX ORDER — DOCUSATE SODIUM 100 MG/1
100 CAPSULE, LIQUID FILLED ORAL 2 TIMES DAILY
Qty: 20 CAPSULE | Refills: 0 | Status: SHIPPED | OUTPATIENT
Start: 2024-10-27

## 2024-10-27 RX ADMIN — ONDANSETRON 4 MG: 2 INJECTION, SOLUTION INTRAMUSCULAR; INTRAVENOUS at 20:17

## 2024-10-27 ASSESSMENT — ACTIVITIES OF DAILY LIVING (ADL)
ADLS_ACUITY_SCORE: 0

## 2024-10-27 NOTE — ED TRIAGE NOTES
CONSTIPATED, TAKING PAIN PILLS FOR BROKEN COLLAR BONE, LAXATIVE NOT WORKING, TOOK MIRALAX TODAY, RIGHT ABD PAIN,

## 2024-10-28 NOTE — ED PROVIDER NOTES
Emergency Department Note      History of Present Illness     Chief Complaint   Constipation      HPI   Malgorzata Coe is a 68 year old female presenting with constipation and abdominal pain.  She states that she has been on pain pills for a clavicle fracture and has been constipated despite taking stool softeners at home.  She relays over the last 24 to 48 hours developing crampy abdominal pain in the bilateral lower quadrants.  She denies fevers.  She has been nauseated but no vomiting.  No further aggravating or alleviating factors, no further associated symptoms.    Independent Historian   None    Review of External Notes   Family medicine visit from 10/25/2024 was reviewed for frequent falls and her right clavicle fracture.    Past Medical History     Medical History and Problem List   Past Medical History:   Diagnosis Date    Anxiety     Bipolar 1 disorder (H)     Gastroesophageal reflux disease     Migraine        Medications   AMLODIPINE BESYLATE PO  amoxicillin-clavulanate (AUGMENTIN) 875-125 MG tablet  ARIPiprazole (ABILIFY) 30 MG tablet  cetirizine (ZYRTEC) 10 MG tablet  chlorhexidine (PERIDEX) 0.12 % solution  docusate sodium (COLACE) 100 MG capsule  ESOMEPRAZOLE MAGNESIUM PO  FOLIC ACID PO  hydrOXYzine (ATARAX) 25 MG tablet  HYDROXYZINE PAMOATE PO  LAMOTRIGINE PO  LOSARTAN POTASSIUM PO  meclizine (ANTIVERT) 12.5 MG tablet  MELATONIN PO  ondansetron (ZOFRAN ODT) 4 MG ODT tab  ONDANSETRON PO  Oxycodone-Acetaminophen (PERCOCET PO)  predniSONE (DELTASONE) 20 MG tablet  RANITIDINE HCL PO  Rizatriptan Benzoate (MAXALT PO)  senna (SENOKOT) 8.6 MG tablet  Solifenacin Succinate (VESICARE PO)  TERAZOSIN HCL PO  traMADol (ULTRAM) 50 MG tablet  VENLAFAXINE HCL PO        Surgical History   Past Surgical History:   Procedure Laterality Date    BACK SURGERY      IR LUMBAR PUNCTURE  1/27/2016    IR LUMBAR PUNCTURE  12/17/2015       Physical Exam     Patient Vitals for the past 24 hrs:   BP Temp Temp src Pulse Resp  SpO2   10/27/24 1754 (!) 144/80 97.6  F (36.4  C) Temporal 70 16 93 %     Physical Exam  General: Alert, interactive   Head:  Scalp is atraumatic  Eyes:  No scleral icterus  ENT:      Nose:  The external nose is normal  Ears:  External ears are normal      Neck:  Normal range of motion.      There is no rigidity.    Trachea is in the midline         CV:  Regular rate and rhythm      Resp:  Breath sounds are clear bilaterally      GI:  Abdomen is soft, no distension, BLQ tenderness.       MS:  Normal strength in all 4 extremities, patient is wearing a sling on the right  Skin:  Warm and dry, No rash or lesions noted.    Psych: Awake. Alert.  Normal affect.      Appropriate interactions.    Diagnostics     Lab Results   Labs Ordered and Resulted from Time of ED Arrival to Time of ED Departure   COMPREHENSIVE METABOLIC PANEL - Abnormal       Result Value    Sodium 132 (*)     Potassium 4.3      Carbon Dioxide (CO2) 32 (*)     Anion Gap 7      Urea Nitrogen 15.1      Creatinine 0.91      GFR Estimate 69      Calcium 9.2      Chloride 93 (*)     Glucose 106 (*)     Alkaline Phosphatase 101      AST 27      ALT 15      Protein Total 7.1      Albumin 4.1      Bilirubin Total 0.3     ROUTINE UA WITH MICROSCOPIC REFLEX TO CULTURE - Abnormal    Color Urine Light Yellow      Appearance Urine Clear      Glucose Urine Negative      Bilirubin Urine Negative      Ketones Urine 10 (*)     Specific Gravity Urine 1.019      Blood Urine Negative      pH Urine 7.0      Protein Albumin Urine 10 (*)     Urobilinogen Urine Normal      Nitrite Urine Negative      Leukocyte Esterase Urine Negative      RBC Urine 0      WBC Urine <1      Squamous Epithelials Urine <1     CBC WITH PLATELETS AND DIFFERENTIAL - Abnormal    WBC Count 7.7      RBC Count 3.93      Hemoglobin 11.1 (*)     Hematocrit 35.1      MCV 89      MCH 28.2      MCHC 31.6      RDW 15.7 (*)     Platelet Count 326      % Neutrophils 76      % Lymphocytes 13      % Monocytes 9       % Eosinophils 1      % Basophils 0      % Immature Granulocytes 1      NRBCs per 100 WBC 0      Absolute Neutrophils 5.8      Absolute Lymphocytes 1.0      Absolute Monocytes 0.7      Absolute Eosinophils 0.1      Absolute Basophils 0.0      Absolute Immature Granulocytes 0.0      Absolute NRBCs 0.0     LIPASE - Normal    Lipase 32         Imaging   CT Abdomen Pelvis without Contrast (stone protocol)   Final Result   IMPRESSION:    1.  Moderate distention and mild thickening of the mid sigmoid colon with adjacent stranding and fluid. Findings may represent some mild diverticulitis or colitis changes. No evidence for perforation or abscess formation.   2.  Appendix not seen.   3.  No urinary collecting system dilatation or calculi.   4.  Stable moderate hiatal hernia.           Independent Interpretation   None    ED Course      Medications Administered   Medications - No data to display      Procedures   Procedures     Discussion of Management   None    ED Course        Additional Documentation  None    Medical Decision Making / Diagnosis     CMS Diagnoses: None    MIPS       None    [facility]   Malgorzata Coe is a 68 year old female presenting with constipation and abdominal pain.  The above workup was undertaken demonstrating diverticulitis as well as a moderate stool burden.  Should be placed on Augmentin as noted below as well as Colace for stool softening.  There is no signs of peritonitis or more concerning illness.  She feels comfortable with this plan of action.  She will follow-up with her primary care provider.    Disposition   The patient was discharged.     Diagnosis     ICD-10-CM    1. Diverticulitis of colon  K57.32       2. Drug-induced constipation  K59.03            Discharge Medications   Discharge Medication List as of 10/27/2024 10:18 PM            MD Suzy Marino Brandon Thomas, MD  10/28/24 0030

## 2024-10-29 ENCOUNTER — HOSPITAL ENCOUNTER (EMERGENCY)
Facility: CLINIC | Age: 69
Discharge: HOME | End: 2024-10-29
Attending: EMERGENCY MEDICINE | Admitting: EMERGENCY MEDICINE
Payer: MEDICARE

## 2024-10-29 ENCOUNTER — HOSPITAL ENCOUNTER (EMERGENCY)
Facility: CLINIC | Age: 69
Discharge: HOME | End: 2024-10-30
Payer: MEDICARE

## 2024-10-29 VITALS
HEART RATE: 70 BPM | OXYGEN SATURATION: 90 % | DIASTOLIC BLOOD PRESSURE: 70 MMHG | RESPIRATION RATE: 18 BRPM | SYSTOLIC BLOOD PRESSURE: 101 MMHG | TEMPERATURE: 98.3 F

## 2024-10-29 DIAGNOSIS — R10.9 ABDOMINAL CRAMPING: ICD-10-CM

## 2024-10-29 LAB
ALBUMIN SERPL BCG-MCNC: 3.9 G/DL (ref 3.5–5.2)
ALP SERPL-CCNC: 95 U/L (ref 40–150)
ALT SERPL W P-5'-P-CCNC: 13 U/L (ref 0–50)
ANION GAP SERPL CALCULATED.3IONS-SCNC: 9 MMOL/L (ref 7–15)
AST SERPL W P-5'-P-CCNC: 21 U/L (ref 0–45)
BASOPHILS # BLD AUTO: 0 10E3/UL (ref 0–0.2)
BASOPHILS NFR BLD AUTO: 1 %
BILIRUB SERPL-MCNC: 0.3 MG/DL
BUN SERPL-MCNC: 21.6 MG/DL (ref 8–23)
CALCIUM SERPL-MCNC: 9.1 MG/DL (ref 8.8–10.4)
CHLORIDE SERPL-SCNC: 98 MMOL/L (ref 98–107)
CREAT SERPL-MCNC: 0.98 MG/DL (ref 0.51–0.95)
EGFRCR SERPLBLD CKD-EPI 2021: 63 ML/MIN/1.73M2
EOSINOPHIL # BLD AUTO: 0.1 10E3/UL (ref 0–0.7)
EOSINOPHIL NFR BLD AUTO: 1 %
ERYTHROCYTE [DISTWIDTH] IN BLOOD BY AUTOMATED COUNT: 15.6 % (ref 10–15)
GLUCOSE SERPL-MCNC: 119 MG/DL (ref 70–99)
HCO3 SERPL-SCNC: 30 MMOL/L (ref 22–29)
HCT VFR BLD AUTO: 33.8 % (ref 35–47)
HGB BLD-MCNC: 11.1 G/DL (ref 11.7–15.7)
IMM GRANULOCYTES # BLD: 0 10E3/UL
IMM GRANULOCYTES NFR BLD: 0 %
LYMPHOCYTES # BLD AUTO: 1.3 10E3/UL (ref 0.8–5.3)
LYMPHOCYTES NFR BLD AUTO: 17 %
MCH RBC QN AUTO: 28.5 PG (ref 26.5–33)
MCHC RBC AUTO-ENTMCNC: 32.8 G/DL (ref 31.5–36.5)
MCV RBC AUTO: 87 FL (ref 78–100)
MONOCYTES # BLD AUTO: 1 10E3/UL (ref 0–1.3)
MONOCYTES NFR BLD AUTO: 12 %
NEUTROPHILS # BLD AUTO: 5.4 10E3/UL (ref 1.6–8.3)
NEUTROPHILS NFR BLD AUTO: 69 %
NRBC # BLD AUTO: 0 10E3/UL
NRBC BLD AUTO-RTO: 0 /100
PLATELET # BLD AUTO: 332 10E3/UL (ref 150–450)
POTASSIUM SERPL-SCNC: 4.1 MMOL/L (ref 3.4–5.3)
PROT SERPL-MCNC: 6.9 G/DL (ref 6.4–8.3)
RBC # BLD AUTO: 3.89 10E6/UL (ref 3.8–5.2)
SODIUM SERPL-SCNC: 137 MMOL/L (ref 135–145)
WBC # BLD AUTO: 7.8 10E3/UL (ref 4–11)

## 2024-10-29 PROCEDURE — 85025 COMPLETE CBC W/AUTO DIFF WBC: CPT | Performed by: STUDENT IN AN ORGANIZED HEALTH CARE EDUCATION/TRAINING PROGRAM

## 2024-10-29 PROCEDURE — 36415 COLL VENOUS BLD VENIPUNCTURE: CPT | Performed by: STUDENT IN AN ORGANIZED HEALTH CARE EDUCATION/TRAINING PROGRAM

## 2024-10-29 PROCEDURE — 99283 EMERGENCY DEPT VISIT LOW MDM: CPT

## 2024-10-29 PROCEDURE — 85025 COMPLETE CBC W/AUTO DIFF WBC: CPT | Performed by: EMERGENCY MEDICINE

## 2024-10-29 PROCEDURE — 80053 COMPREHEN METABOLIC PANEL: CPT | Performed by: EMERGENCY MEDICINE

## 2024-10-29 PROCEDURE — 80053 COMPREHEN METABOLIC PANEL: CPT | Performed by: STUDENT IN AN ORGANIZED HEALTH CARE EDUCATION/TRAINING PROGRAM

## 2024-10-29 RX ORDER — MAGNESIUM CARB/ALUMINUM HYDROX 105-160MG
296 TABLET,CHEWABLE ORAL ONCE
Status: DISCONTINUED | OUTPATIENT
Start: 2024-10-29 | End: 2024-10-30 | Stop reason: HOSPADM

## 2024-10-29 ASSESSMENT — ACTIVITIES OF DAILY LIVING (ADL)
ADLS_ACUITY_SCORE: 0

## 2024-10-29 NOTE — ED TRIAGE NOTES
"Pt reports she was here a few days ago for abdominal pain and found to have diverticulitis. She also reports pain to her right clavicle area \"but I don't know if I did anything to it\". Per her chart, she has a right clavicle fracture, the pt reportedly does not know she has a fracture.     She is complaining of worsening abdominal pain. Complaining of constipation.       "

## 2024-10-30 NOTE — ED PROVIDER NOTES
Emergency Department Note      History of Present Illness     Chief Complaint   Abdominal Pain      HPI   Malgorzata Coe is a 68 year old female with a history of diverticulitis, chronic constipation, hypertension, and rectal prolapse who presents to the ED for evaluation of abdominal pain. The patient states she has been experiencing abdominal pain, constipation, and some fecal incontinence for the past couple of weeks. She has attempted to treat her constipation with stool softener, Miralax, and senna with no relief. She took Miralax 4 times today. Endorses rectal pain with bearing down. She has had only very small hard bowel movements this week. Reports a recent diagnosis of diverticulitis on 10/27/24 and was started on Augmentin which she has been taking as directed. Also reports a recent right clavicle fracture with residual right clavicle and shoulder pain. She was briefly on narcotic pain medication following the fracture which she believes caused her constipation.     Independent Historian   None    Review of External Notes   I reviewed the office visit on 10/25/24 and ED note from 10/27/24.    Past Medical History     Medical History and Problem List   Chronic interstitial cystitis  DDD  HTN  Diverticulosis  Anxiety  GERD  Gastric ulcer  Hyperparathyroidism  Osteoporosis  Umbilical hernia  Urge incontinence  Chronic constipation  Anemia  Bipolar I disorder  BPD  PTSD  Endometriosis  Migraine  TROY  Lumbar pseudoarthritis  Rectal prolapse  Tobacco use disorder  Somatization disorder     Medications   Albuterol  Symbicort  Clonazepam  Flexeril  Pristiq  Valium  Bentyl  Famotidine  Fluticasone  Hydrochlorothiazide  Levaquin  Linzess  Amerge  Propranolol  Flomax  Trazodone  Spiriva respimat  Amlodipine  Nexium  Hydroxyzine  Lamictal  Losartan  Percocet  Augmentin  Abilify    Surgical History   Unspecified back surgery    Physical Exam     Patient Vitals for the past 24 hrs:   BP Temp Temp src Pulse Resp SpO2    10/29/24 2235 101/70 -- -- 70 -- 90 %   10/29/24 1700 -- 98.3  F (36.8  C) Temporal -- -- --   10/29/24 1658 104/69 -- -- 73 18 91 %     Physical Exam  Eye:  Pupils are equal, round, and reactive.  Extraocular movements intact.    ENT:  No rhinorrhea.  Moist mucus membranes.  Normal tongue and tonsil.    Cardiac:  Regular rate and rhythm.  No murmurs, gallops, or rubs.    Pulmonary:  Clear to auscultation bilaterally.  No wheezes, rales, or rhonchi.    Abdomen:  Positive bowel sounds.  Abdomen is soft and non-distended, without focal tenderness. Rectal exam shows no significant stool in the vault.     Musculoskeletal:  Normal movement of all extremities without evidence for deficit. Right arm is in a sling due to known clavicle fracture.    Skin:  Warm and dry without rashes.    Neurologic:  Non-focal exam without asymmetric weakness or numbness.     Psychiatric:  Normal affect with appropriate interaction with examiner.    Diagnostics     Lab Results   Labs Ordered and Resulted from Time of ED Arrival to Time of ED Departure   COMPREHENSIVE METABOLIC PANEL - Abnormal       Result Value    Sodium 137      Potassium 4.1      Carbon Dioxide (CO2) 30 (*)     Anion Gap 9      Urea Nitrogen 21.6      Creatinine 0.98 (*)     GFR Estimate 63      Calcium 9.1      Chloride 98      Glucose 119 (*)     Alkaline Phosphatase 95      AST 21      ALT 13      Protein Total 6.9      Albumin 3.9      Bilirubin Total 0.3     CBC WITH PLATELETS AND DIFFERENTIAL - Abnormal    WBC Count 7.8      RBC Count 3.89      Hemoglobin 11.1 (*)     Hematocrit 33.8 (*)     MCV 87      MCH 28.5      MCHC 32.8      RDW 15.6 (*)     Platelet Count 332      % Neutrophils 69      % Lymphocytes 17      % Monocytes 12      % Eosinophils 1      % Basophils 1      % Immature Granulocytes 0      NRBCs per 100 WBC 0      Absolute Neutrophils 5.4      Absolute Lymphocytes 1.3      Absolute Monocytes 1.0      Absolute Eosinophils 0.1      Absolute Basophils  0.0      Absolute Immature Granulocytes 0.0      Absolute NRBCs 0.0         Imaging   No orders to display     Independent Interpretation   None    ED Course      Medications Administered   Medications - No data to display    Procedures   Procedures     Discussion of Management   None    ED Course   ED Course as of 10/30/24 1407   Tue Oct 29, 2024   2107 I obtained history and performed a physical exam as noted above.    2204 I rechecked and updated the patient.        Additional Documentation  None    Medical Decision Making / Diagnosis     CMS Diagnoses: None    MIPS       None    MDM   Malgorzata Coe is a 68 year old female returning to us with ongoing issues of abdominal cramping.  She was seen by my partner 2 days ago and diagnosed with mild diverticulitis.  She is currently on antibiotics.  She has taken some MiraLAX and describes having some generalized abdominal cramping, right side greater than left.  She has not had a fever with this.  She has had some diarrhea.  She is worried that she is still constipated.    On my exam, she appears clinically well.  Vital signs are normal.  Laboratory investigation is completely unremarkable.  I see no indication to repeat her imaging considering her lack of discomfort in the left lower quadrant.  Rectal exam does not show any significant stool in the vault.  She was given an enema at her request with improvement in her symptoms.    At this juncture, I feel she is safe for discharge home.  She will continue with antibiotics.  I have given her a bottle of magnesium citrate to help move stool from further up in her intestinal tract, especially after reviewing her CT and showing that she did have a significant stool load a few days ago.  She will otherwise follow-up with her primary team with immediate return for any worsening of condition or other emergent concerns.    Disposition   The patient was discharged.     Diagnosis     ICD-10-CM    1. Abdominal cramping  R10.9             Discharge Medications   Discharge Medication List as of 10/29/2024 10:20 PM            Scribe Disclosure:  I, Maxine Matthews, am serving as a scribe at 9:00 PM on 10/29/2024 to document services personally performed by Trierweiler, Chad A, MD based on my observations and the provider's statements to me.        Trierweiler, Chad A, MD  10/30/24 4047